# Patient Record
Sex: MALE | Race: WHITE | Employment: OTHER | URBAN - METROPOLITAN AREA
[De-identification: names, ages, dates, MRNs, and addresses within clinical notes are randomized per-mention and may not be internally consistent; named-entity substitution may affect disease eponyms.]

---

## 2019-05-15 ENCOUNTER — OFFICE VISIT (OUTPATIENT)
Dept: INTERNAL MEDICINE CLINIC | Age: 44
End: 2019-05-15
Payer: COMMERCIAL

## 2019-05-15 VITALS
HEART RATE: 81 BPM | WEIGHT: 224 LBS | DIASTOLIC BLOOD PRESSURE: 76 MMHG | OXYGEN SATURATION: 98 % | SYSTOLIC BLOOD PRESSURE: 118 MMHG | HEIGHT: 75 IN | BODY MASS INDEX: 27.85 KG/M2

## 2019-05-15 DIAGNOSIS — R53.82 CHRONIC FATIGUE: ICD-10-CM

## 2019-05-15 DIAGNOSIS — Z00.00 PHYSICAL EXAM, ANNUAL: Primary | ICD-10-CM

## 2019-05-15 DIAGNOSIS — G93.32 CHRONIC FATIGUE SYNDROME: Primary | ICD-10-CM

## 2019-05-15 DIAGNOSIS — Z00.00 PHYSICAL EXAM, ANNUAL: ICD-10-CM

## 2019-05-15 LAB
A/G RATIO: 1.4 (ref 1.1–2.2)
ALBUMIN SERPL-MCNC: 4.4 G/DL (ref 3.4–5)
ALP BLD-CCNC: 76 U/L (ref 40–129)
ALT SERPL-CCNC: 24 U/L (ref 10–40)
ANION GAP SERPL CALCULATED.3IONS-SCNC: 14 MMOL/L (ref 3–16)
AST SERPL-CCNC: 17 U/L (ref 15–37)
BASOPHILS ABSOLUTE: 0 K/UL (ref 0–0.2)
BASOPHILS RELATIVE PERCENT: 0.6 %
BILIRUB SERPL-MCNC: 0.5 MG/DL (ref 0–1)
BUN BLDV-MCNC: 16 MG/DL (ref 7–20)
CALCIUM SERPL-MCNC: 9.3 MG/DL (ref 8.3–10.6)
CHLORIDE BLD-SCNC: 104 MMOL/L (ref 99–110)
CHOLESTEROL, TOTAL: 242 MG/DL (ref 0–199)
CO2: 24 MMOL/L (ref 21–32)
CREAT SERPL-MCNC: 1.3 MG/DL (ref 0.9–1.3)
EOSINOPHILS ABSOLUTE: 0.1 K/UL (ref 0–0.6)
EOSINOPHILS RELATIVE PERCENT: 2.2 %
FOLATE: >20 NG/ML (ref 4.78–24.2)
GFR AFRICAN AMERICAN: >60
GFR NON-AFRICAN AMERICAN: >60
GLOBULIN: 3.1 G/DL
GLUCOSE BLD-MCNC: 91 MG/DL (ref 70–99)
HCT VFR BLD CALC: 48.3 % (ref 40.5–52.5)
HDLC SERPL-MCNC: 34 MG/DL (ref 40–60)
HEMOGLOBIN: 16.7 G/DL (ref 13.5–17.5)
LDL CHOLESTEROL CALCULATED: 167 MG/DL
LYMPHOCYTES ABSOLUTE: 1.8 K/UL (ref 1–5.1)
LYMPHOCYTES RELATIVE PERCENT: 29.3 %
MCH RBC QN AUTO: 31.9 PG (ref 26–34)
MCHC RBC AUTO-ENTMCNC: 34.6 G/DL (ref 31–36)
MCV RBC AUTO: 92.3 FL (ref 80–100)
MONOCYTES ABSOLUTE: 0.4 K/UL (ref 0–1.3)
MONOCYTES RELATIVE PERCENT: 7.2 %
NEUTROPHILS ABSOLUTE: 3.6 K/UL (ref 1.7–7.7)
NEUTROPHILS RELATIVE PERCENT: 60.7 %
PDW BLD-RTO: 13 % (ref 12.4–15.4)
PLATELET # BLD: 198 K/UL (ref 135–450)
PMV BLD AUTO: 8.5 FL (ref 5–10.5)
POTASSIUM SERPL-SCNC: 4.6 MMOL/L (ref 3.5–5.1)
RBC # BLD: 5.23 M/UL (ref 4.2–5.9)
SODIUM BLD-SCNC: 142 MMOL/L (ref 136–145)
TOTAL PROTEIN: 7.5 G/DL (ref 6.4–8.2)
TRIGL SERPL-MCNC: 206 MG/DL (ref 0–150)
TSH SERPL DL<=0.05 MIU/L-ACNC: 2.04 UIU/ML (ref 0.27–4.2)
VITAMIN B-12: 830 PG/ML (ref 211–911)
VITAMIN D 25-HYDROXY: 22.8 NG/ML
VLDLC SERPL CALC-MCNC: 41 MG/DL
WBC # BLD: 6 K/UL (ref 4–11)

## 2019-05-15 PROCEDURE — 99386 PREV VISIT NEW AGE 40-64: CPT | Performed by: INTERNAL MEDICINE

## 2019-05-15 ASSESSMENT — PATIENT HEALTH QUESTIONNAIRE - PHQ9
SUM OF ALL RESPONSES TO PHQ QUESTIONS 1-9: 0
2. FEELING DOWN, DEPRESSED OR HOPELESS: 0
1. LITTLE INTEREST OR PLEASURE IN DOING THINGS: 0
SUM OF ALL RESPONSES TO PHQ QUESTIONS 1-9: 0
SUM OF ALL RESPONSES TO PHQ9 QUESTIONS 1 & 2: 0

## 2019-05-16 LAB
ESTIMATED AVERAGE GLUCOSE: 111.2 MG/DL
HBA1C MFR BLD: 5.5 %

## 2019-05-29 ENCOUNTER — OFFICE VISIT (OUTPATIENT)
Dept: INTERNAL MEDICINE CLINIC | Age: 44
End: 2019-05-29
Payer: COMMERCIAL

## 2019-05-29 VITALS
BODY MASS INDEX: 28.47 KG/M2 | HEART RATE: 76 BPM | SYSTOLIC BLOOD PRESSURE: 110 MMHG | DIASTOLIC BLOOD PRESSURE: 80 MMHG | HEIGHT: 75 IN | WEIGHT: 229 LBS | OXYGEN SATURATION: 99 %

## 2019-05-29 DIAGNOSIS — R53.82 CHRONIC FATIGUE: ICD-10-CM

## 2019-05-29 DIAGNOSIS — Z12.5 SCREENING FOR PROSTATE CANCER: ICD-10-CM

## 2019-05-29 DIAGNOSIS — Z00.00 PHYSICAL EXAM, ANNUAL: Primary | ICD-10-CM

## 2019-05-29 DIAGNOSIS — Z00.00 PHYSICAL EXAM, ANNUAL: ICD-10-CM

## 2019-05-29 LAB — PROSTATE SPECIFIC ANTIGEN: 0.7 NG/ML (ref 0–4)

## 2019-05-29 PROCEDURE — 99396 PREV VISIT EST AGE 40-64: CPT | Performed by: INTERNAL MEDICINE

## 2019-05-29 NOTE — PROGRESS NOTES
Chief complaints:  Jayna Hercules is a 44 y.o. male who presents to the office for a general physical examination. History of present illness:  Here for a physical exam ,  Had fasting blood work last week,   Reviewed blood work,   Also c/o fatigue, and general weakness,   Worried about testosterone levels and psa. No other specific acute problems or symptoms     Past Medical History:   Diagnosis Date    Substance abuse ([address]      Current Outpatient Medications   Medication Sig Dispense Refill    Loratadine-Pseudoephedrine (CLARITIN-D 24 HOUR PO) Take by mouth       No current facility-administered medications for this visit. Allergies : Patient has no known allergies. No past surgical history on file. Family History   Problem Relation Age of Onset    Other Brother     Cancer Paternal Grandmother     Heart Disease Paternal Grandfather     High Blood Pressure Paternal Grandfather     High Cholesterol Paternal Grandfather     Heart Attack Paternal Grandfather      Social History     Tobacco Use    Smoking status: Never Smoker    Smokeless tobacco: Never Used   Substance Use Topics    Alcohol use: Yes     Alcohol/week: 1.2 oz     Types: 2 Glasses of wine per week       Review of systems :  Skin: no abnormal pigmentation, rash, scaling, itching, masses, hair or nail changes  Eyes: negative  Ears/Nose/Throat: negative  Respiratory: negative  Cardiovascular: negative  Gastrointestinal: negative  Genitourinary: negative  Musculoskeletal: negative  Neurologic: negative  Psychiatric: negative  Hematologic/Lymphatic/Immunologic: negative  Endocrine: negative       Objective :     /80 (Site: Right Upper Arm, Position: Sitting, Cuff Size: Medium Adult)   Pulse 76   Ht 6' 2.75\" (1.899 m)   Wt 229 lb (103.9 kg)   SpO2 99%   BMI 28.81 kg/m²   General appearance - healthy, alert, no distress  Skin - Skin color, texture, turgor normal. No rashes or lesions. Head - Normocephalic.  No masses, lesions,

## 2019-05-31 LAB
SEX HORMONE BINDING GLOBULIN: 43 NMOL/L (ref 11–80)
TESTOSTERONE FREE-NONMALE: 91.3 PG/ML (ref 47–244)
TESTOSTERONE TOTAL: 512 NG/DL (ref 220–1000)

## 2019-06-03 ENCOUNTER — TELEPHONE (OUTPATIENT)
Dept: INTERNAL MEDICINE CLINIC | Age: 44
End: 2019-06-03

## 2019-10-02 ENCOUNTER — OFFICE VISIT (OUTPATIENT)
Dept: PRIMARY CARE CLINIC | Age: 44
End: 2019-10-02
Payer: COMMERCIAL

## 2019-10-02 VITALS
HEIGHT: 75 IN | TEMPERATURE: 98 F | BODY MASS INDEX: 27.95 KG/M2 | WEIGHT: 224.8 LBS | OXYGEN SATURATION: 96 % | SYSTOLIC BLOOD PRESSURE: 128 MMHG | RESPIRATION RATE: 16 BRPM | DIASTOLIC BLOOD PRESSURE: 88 MMHG | HEART RATE: 98 BPM

## 2019-10-02 DIAGNOSIS — J32.0 CHRONIC MAXILLARY SINUSITIS: Primary | ICD-10-CM

## 2019-10-02 PROCEDURE — 99213 OFFICE O/P EST LOW 20 MIN: CPT | Performed by: INTERNAL MEDICINE

## 2019-10-02 RX ORDER — METHYLPREDNISOLONE 4 MG/1
TABLET ORAL
Qty: 1 KIT | Refills: 0 | Status: SHIPPED | OUTPATIENT
Start: 2019-10-02 | End: 2019-10-14

## 2019-10-02 RX ORDER — GUAIFENESIN AND PSEUDOEPHEDRINE HCL 1200; 120 MG/1; MG/1
TABLET, EXTENDED RELEASE ORAL
Qty: 20 TABLET | Refills: 0 | Status: ON HOLD | OUTPATIENT
Start: 2019-10-02 | End: 2020-03-18 | Stop reason: HOSPADM

## 2019-10-02 RX ORDER — AMOXICILLIN AND CLAVULANATE POTASSIUM 875; 125 MG/1; MG/1
1 TABLET, FILM COATED ORAL 2 TIMES DAILY
Qty: 20 TABLET | Refills: 0 | Status: SHIPPED | OUTPATIENT
Start: 2019-10-02 | End: 2019-10-12

## 2019-10-14 ENCOUNTER — OFFICE VISIT (OUTPATIENT)
Dept: ENT CLINIC | Age: 44
End: 2019-10-14
Payer: COMMERCIAL

## 2019-10-14 VITALS
SYSTOLIC BLOOD PRESSURE: 127 MMHG | WEIGHT: 227.8 LBS | TEMPERATURE: 97.6 F | HEART RATE: 104 BPM | BODY MASS INDEX: 29.24 KG/M2 | HEIGHT: 74 IN | DIASTOLIC BLOOD PRESSURE: 79 MMHG

## 2019-10-14 DIAGNOSIS — J34.89 NASAL OBSTRUCTION: Primary | ICD-10-CM

## 2019-10-14 DIAGNOSIS — J34.3 HYPERTROPHY OF NASAL TURBINATES: ICD-10-CM

## 2019-10-14 DIAGNOSIS — J34.2 DEVIATED NASAL SEPTUM: ICD-10-CM

## 2019-10-14 DIAGNOSIS — J34.89 INTRANASAL SYNECHIAE: ICD-10-CM

## 2019-10-14 PROCEDURE — 99243 OFF/OP CNSLTJ NEW/EST LOW 30: CPT | Performed by: OTOLARYNGOLOGY

## 2019-10-22 ENCOUNTER — TELEPHONE (OUTPATIENT)
Dept: ENT CLINIC | Age: 44
End: 2019-10-22

## 2020-03-02 ENCOUNTER — TELEPHONE (OUTPATIENT)
Dept: ENT CLINIC | Age: 45
End: 2020-03-02

## 2020-03-02 NOTE — TELEPHONE ENCOUNTER
Pt wants to discuss whether or not surgery is his only option with his sinus condition. Pt has some bleeding and chronic sinusitis and is asking if he could come in and have a nasal scope first before heading to surgery. Advised pt that someone would call to answer his questions as to what next step should be.

## 2020-03-03 ENCOUNTER — OFFICE VISIT (OUTPATIENT)
Dept: ENT CLINIC | Age: 45
End: 2020-03-03
Payer: COMMERCIAL

## 2020-03-03 VITALS
BODY MASS INDEX: 30.29 KG/M2 | HEART RATE: 91 BPM | DIASTOLIC BLOOD PRESSURE: 75 MMHG | SYSTOLIC BLOOD PRESSURE: 110 MMHG | HEIGHT: 74 IN | TEMPERATURE: 98.2 F | WEIGHT: 236 LBS

## 2020-03-03 PROCEDURE — 31231 NASAL ENDOSCOPY DX: CPT | Performed by: OTOLARYNGOLOGY

## 2020-03-03 PROCEDURE — 99212 OFFICE O/P EST SF 10 MIN: CPT | Performed by: OTOLARYNGOLOGY

## 2020-03-09 ENCOUNTER — OFFICE VISIT (OUTPATIENT)
Dept: PRIMARY CARE CLINIC | Age: 45
End: 2020-03-09
Payer: COMMERCIAL

## 2020-03-09 VITALS
HEIGHT: 73 IN | HEART RATE: 92 BPM | WEIGHT: 234 LBS | DIASTOLIC BLOOD PRESSURE: 80 MMHG | OXYGEN SATURATION: 98 % | SYSTOLIC BLOOD PRESSURE: 120 MMHG | BODY MASS INDEX: 31.01 KG/M2

## 2020-03-09 PROBLEM — J34.2 DNS (DEVIATED NASAL SEPTUM): Chronic | Status: ACTIVE | Noted: 2020-03-09

## 2020-03-09 PROCEDURE — 99243 OFF/OP CNSLTJ NEW/EST LOW 30: CPT | Performed by: INTERNAL MEDICINE

## 2020-03-09 ASSESSMENT — PATIENT HEALTH QUESTIONNAIRE - PHQ9
SUM OF ALL RESPONSES TO PHQ9 QUESTIONS 1 & 2: 0
SUM OF ALL RESPONSES TO PHQ QUESTIONS 1-9: 0
2. FEELING DOWN, DEPRESSED OR HOPELESS: 0
1. LITTLE INTEREST OR PLEASURE IN DOING THINGS: 0
SUM OF ALL RESPONSES TO PHQ QUESTIONS 1-9: 0

## 2020-03-09 NOTE — PROGRESS NOTES
negative  Musculoskeletal: negative  Neurologic: negative  Psychiatric: negative  Hematologic/Lymphatic/Immunologic: negative  Endocrine: negative       Objective:      /80 (Site: Left Upper Arm, Position: Sitting, Cuff Size: Medium Adult)   Pulse 92   Ht 6' 1.2\" (1.859 m)   Wt 234 lb (106.1 kg)   SpO2 98%   BMI 30.70 kg/m²   General appearance - healthy, alert, no distress  Skin - Skin color, texture, turgor normal. No rashes or lesions. Head - Normocephalic. No masses, lesions, tenderness or abnormalities  Eyes - conjunctivae/corneas clear. PERRL, EOM's intact. Ears - External ears normal. Canals clear. TM's normal.  Nose/Sinuses - Nares normal. Septum midline. Mucosa normal. No drainage or sinus tenderness. Oropharynx - Lips, mucosa, and tongue normal. Teeth and gums normal. Oropharynx pink and patent  Neck - Neck supple. No adenopathy. Thyroid symmetric, normal size,  Back - Back symmetric, no curvature. ROM normal. No CVA tenderness. Lungs - Percussion normal. Good diaphragmatic excursion. Lungs clear  Heart - Regular rate and rhythm, with no rub, murmur or gallop noted. Abdomen - Abdomen soft, non-tender. BS normal. No masses, organomegaly  Extremities - Extremities normal. No deformities, edema, or skin discolora  Musculoskeletal - Spine ROM normal. Muscular strength intact. Peripheral pulses - radial=2+,, femoral=2+, popliteal=2+, dorsalis pedis=2+,  Neuro - Gait normal. Reflexes normal and symmetric. Sensation grossly normal.  No focal weakness    EKG: not needed. BLOOD WORK: not needed. Assessment:      Pre op eval - SEPTAL RECONSTRUCTION LYSIS INTRANASL SYNECHIAE  DNS (deviated nasal septum)  Here for a pre op eval for  DNS repair,  He is medically stable. Plan:        He is medically cleared for surgery and anesthesia. Per operating surgeon. See also orders filed with this encounter, if any.   Instructions to patient: Do not take any NSAIDS 1 week prior to surgery.   Indication for leah-operative beta blocker therapy: Barbara Christiansen MD   3/9/2020 3:17 PM

## 2020-03-13 NOTE — PROGRESS NOTES
[facility] PRE-SURGICAL TESTING INSTRUCTIONS                              PRIOR TO PROCEDURE DATE:  1. Please follow any guidelines/instructions prior to your procedure as advised by your surgeon. 2. Arrange for someone to drive you home and be with you for the first 24 hours after discharge for your safety after your procedure for which you received sedation. Ensure it is someone we can share information with regarding your discharge. 3. You must contact your surgeon for instructions IF:   You are taking any blood thinners, aspirin, anti-inflammatory or vitamin E.   There is a change in your physical condition such as a cold, fever, rash, cuts, sores or any other infection, especially near your surgical site. 4. Do not drink alcohol the day before or day of your procedure. 5. A Pre-op History and Physical for surgery MUST be completed by your Physician or Urgent Care within 30 days of your procedure date. Please bring a copy with you on the day of your procedure and along with any other testing performed. THE DAY OF YOUR PROCEDURE:  1. Follow instructions for ARRIVAL TIME as DIRECTED BY YOUR SURGEON. I    2. Enter the MAIN entrance from [employer] and follow the signs to the free [employer] or [employer] parking (offered free of charge 6am-5pm). 3. Enter the Main Entrance of the hospital (do not enter from the lower level of the parking garage). Upon entrance, check in with the  at the main desk on your left. If no one is available at the desk, proceed into the [facility] Waiting Room and go through the door directly into the [facility]. There is a Check-in desk ACROSS from Room 5 (marked with a sign hanging from the ceiling). The phone number for the surgery center is [phone]. 4. Please call [phone] option #2 option #2 if you have not been preregistered yet. On the day of your procedure bring your insurance card and photo ID.  You will be potential side effects. 2. For comfort and safety, arrange to have someone at home with you for the first 24 hours after discharge. 3. You and your family will be given written instructions about your diet, activity, dressing care, medications, and return visits. 4. Once at home, should issues with nausea, pain, or bleeding occur, or should you notice any signs of infection, you should call your surgeon. 5. Always clean your hands before and after caring for your wound. Do not let your family touch your surgery site without cleaning their hands. 6. Narcotic pain medications can cause significant constipation. You may want to add a stool softener to your postoperative medication schedule or speak to your surgeon on how best to manage this SIDE EFFECT. SPECIAL INSTRUCTIONS    Thank you for allowing us to care for you. We strive to exceed your expectations in the delivery of care and service provided to you and your family. If you need to contact us for any reason, please call us at [phone]    Instructions reviewed with patient during preadmission testing phone interview. Nelly Mccormick. 3/13/2020 .3:24 PM      ADDITIONAL EDUCATIONAL INFORMATION REVIEWED PER PHONE WITH YOU AND/OR YOUR FAMILY:    Yes Antibacterial Soap

## 2020-03-16 ENCOUNTER — ANESTHESIA EVENT (OUTPATIENT)
Dept: OPERATING ROOM | Age: 45
End: 2020-03-16
Payer: COMMERCIAL

## 2020-03-16 ENCOUNTER — TELEPHONE (OUTPATIENT)
Dept: ENT CLINIC | Age: 45
End: 2020-03-16

## 2020-03-17 ENCOUNTER — ANESTHESIA (OUTPATIENT)
Dept: OPERATING ROOM | Age: 45
End: 2020-03-17
Payer: COMMERCIAL

## 2020-03-17 ENCOUNTER — HOSPITAL ENCOUNTER (OUTPATIENT)
Age: 45
Setting detail: OBSERVATION
Discharge: HOME | End: 2020-03-18
Attending: OTOLARYNGOLOGY | Admitting: OTOLARYNGOLOGY
Payer: COMMERCIAL

## 2020-03-17 VITALS
SYSTOLIC BLOOD PRESSURE: 114 MMHG | OXYGEN SATURATION: 88 % | RESPIRATION RATE: 18 BRPM | TEMPERATURE: 96.4 F | DIASTOLIC BLOOD PRESSURE: 75 MMHG

## 2020-03-17 PROBLEM — J34.89 NASAL OBSTRUCTION: Status: ACTIVE | Noted: 2020-03-17

## 2020-03-17 PROCEDURE — 2580000003 HC RX 258: Performed by: OTOLARYNGOLOGY

## 2020-03-17 PROCEDURE — 6360000002 HC RX W HCPCS: Performed by: ANESTHESIOLOGY

## 2020-03-17 PROCEDURE — 2580000003 HC RX 258: Performed by: ANESTHESIOLOGY

## 2020-03-17 PROCEDURE — 30520 REPAIR OF NASAL SEPTUM: CPT | Performed by: OTOLARYNGOLOGY

## 2020-03-17 PROCEDURE — 2709999900 HC NON-CHARGEABLE SUPPLY: Performed by: OTOLARYNGOLOGY

## 2020-03-17 PROCEDURE — 3700000000 HC ANESTHESIA ATTENDED CARE: Performed by: OTOLARYNGOLOGY

## 2020-03-17 PROCEDURE — 7100000000 HC PACU RECOVERY - FIRST 15 MIN: Performed by: OTOLARYNGOLOGY

## 2020-03-17 PROCEDURE — G0378 HOSPITAL OBSERVATION PER HR: HCPCS

## 2020-03-17 PROCEDURE — 2500000003 HC RX 250 WO HCPCS: Performed by: NURSE ANESTHETIST, CERTIFIED REGISTERED

## 2020-03-17 PROCEDURE — 3600000014 HC SURGERY LEVEL 4 ADDTL 15MIN: Performed by: OTOLARYNGOLOGY

## 2020-03-17 PROCEDURE — 3600000004 HC SURGERY LEVEL 4 BASE: Performed by: OTOLARYNGOLOGY

## 2020-03-17 PROCEDURE — 6360000002 HC RX W HCPCS: Performed by: NURSE ANESTHETIST, CERTIFIED REGISTERED

## 2020-03-17 PROCEDURE — 2500000003 HC RX 250 WO HCPCS: Performed by: OTOLARYNGOLOGY

## 2020-03-17 PROCEDURE — 3700000001 HC ADD 15 MINUTES (ANESTHESIA): Performed by: OTOLARYNGOLOGY

## 2020-03-17 PROCEDURE — 7100000001 HC PACU RECOVERY - ADDTL 15 MIN: Performed by: OTOLARYNGOLOGY

## 2020-03-17 PROCEDURE — 30560 LYSIS INTRANASAL SYNECHIA: CPT | Performed by: OTOLARYNGOLOGY

## 2020-03-17 PROCEDURE — 6370000000 HC RX 637 (ALT 250 FOR IP): Performed by: OTOLARYNGOLOGY

## 2020-03-17 PROCEDURE — 30130 EXCISE INFERIOR TURBINATE: CPT | Performed by: OTOLARYNGOLOGY

## 2020-03-17 RX ORDER — ONDANSETRON 2 MG/ML
4 INJECTION INTRAMUSCULAR; INTRAVENOUS
Status: DISCONTINUED | OUTPATIENT
Start: 2020-03-17 | End: 2020-03-17 | Stop reason: HOSPADM

## 2020-03-17 RX ORDER — LABETALOL 20 MG/4 ML (5 MG/ML) INTRAVENOUS SYRINGE
5 EVERY 10 MIN PRN
Status: DISCONTINUED | OUTPATIENT
Start: 2020-03-17 | End: 2020-03-17 | Stop reason: HOSPADM

## 2020-03-17 RX ORDER — PROMETHAZINE HYDROCHLORIDE 25 MG/1
12.5 TABLET ORAL EVERY 6 HOURS PRN
Status: DISCONTINUED | OUTPATIENT
Start: 2020-03-17 | End: 2020-03-18 | Stop reason: HOSPADM

## 2020-03-17 RX ORDER — HYDRALAZINE HYDROCHLORIDE 20 MG/ML
5 INJECTION INTRAMUSCULAR; INTRAVENOUS EVERY 10 MIN PRN
Status: DISCONTINUED | OUTPATIENT
Start: 2020-03-17 | End: 2020-03-17 | Stop reason: HOSPADM

## 2020-03-17 RX ORDER — FENTANYL CITRATE 50 UG/ML
50 INJECTION, SOLUTION INTRAMUSCULAR; INTRAVENOUS EVERY 5 MIN PRN
Status: DISCONTINUED | OUTPATIENT
Start: 2020-03-17 | End: 2020-03-17 | Stop reason: HOSPADM

## 2020-03-17 RX ORDER — M-VIT,TX,IRON,MINS/CALC/FOLIC 27MG-0.4MG
1 TABLET ORAL DAILY
COMMUNITY
End: 2020-11-23

## 2020-03-17 RX ORDER — OXYCODONE HYDROCHLORIDE 5 MG/1
10 TABLET ORAL EVERY 4 HOURS PRN
Status: DISCONTINUED | OUTPATIENT
Start: 2020-03-17 | End: 2020-03-18 | Stop reason: HOSPADM

## 2020-03-17 RX ORDER — SODIUM CHLORIDE, SODIUM LACTATE, POTASSIUM CHLORIDE, CALCIUM CHLORIDE 600; 310; 30; 20 MG/100ML; MG/100ML; MG/100ML; MG/100ML
INJECTION, SOLUTION INTRAVENOUS CONTINUOUS
Status: DISCONTINUED | OUTPATIENT
Start: 2020-03-17 | End: 2020-03-17

## 2020-03-17 RX ORDER — OXYCODONE HYDROCHLORIDE AND ACETAMINOPHEN 5; 325 MG/1; MG/1
2 TABLET ORAL PRN
Status: DISCONTINUED | OUTPATIENT
Start: 2020-03-17 | End: 2020-03-17 | Stop reason: HOSPADM

## 2020-03-17 RX ORDER — SODIUM CHLORIDE, SODIUM LACTATE, POTASSIUM CHLORIDE, CALCIUM CHLORIDE 600; 310; 30; 20 MG/100ML; MG/100ML; MG/100ML; MG/100ML
INJECTION, SOLUTION INTRAVENOUS CONTINUOUS
Status: DISCONTINUED | OUTPATIENT
Start: 2020-03-17 | End: 2020-03-17 | Stop reason: SDUPTHER

## 2020-03-17 RX ORDER — ROCURONIUM BROMIDE 10 MG/ML
INJECTION, SOLUTION INTRAVENOUS PRN
Status: DISCONTINUED | OUTPATIENT
Start: 2020-03-17 | End: 2020-03-17 | Stop reason: SDUPTHER

## 2020-03-17 RX ORDER — MORPHINE SULFATE 2 MG/ML
2 INJECTION, SOLUTION INTRAMUSCULAR; INTRAVENOUS
Status: DISCONTINUED | OUTPATIENT
Start: 2020-03-17 | End: 2020-03-18 | Stop reason: HOSPADM

## 2020-03-17 RX ORDER — HYDROMORPHONE HCL 110MG/55ML
PATIENT CONTROLLED ANALGESIA SYRINGE INTRAVENOUS PRN
Status: DISCONTINUED | OUTPATIENT
Start: 2020-03-17 | End: 2020-03-17 | Stop reason: SDUPTHER

## 2020-03-17 RX ORDER — SODIUM CHLORIDE 0.9 % (FLUSH) 0.9 %
10 SYRINGE (ML) INJECTION PRN
Status: DISCONTINUED | OUTPATIENT
Start: 2020-03-17 | End: 2020-03-18 | Stop reason: HOSPADM

## 2020-03-17 RX ORDER — DEXAMETHASONE SODIUM PHOSPHATE 4 MG/ML
INJECTION, SOLUTION INTRA-ARTICULAR; INTRALESIONAL; INTRAMUSCULAR; INTRAVENOUS; SOFT TISSUE PRN
Status: DISCONTINUED | OUTPATIENT
Start: 2020-03-17 | End: 2020-03-17 | Stop reason: SDUPTHER

## 2020-03-17 RX ORDER — MEPERIDINE HYDROCHLORIDE 25 MG/ML
12.5 INJECTION INTRAMUSCULAR; INTRAVENOUS; SUBCUTANEOUS EVERY 5 MIN PRN
Status: DISCONTINUED | OUTPATIENT
Start: 2020-03-17 | End: 2020-03-17 | Stop reason: HOSPADM

## 2020-03-17 RX ORDER — MORPHINE SULFATE 4 MG/ML
4 INJECTION, SOLUTION INTRAMUSCULAR; INTRAVENOUS
Status: DISCONTINUED | OUTPATIENT
Start: 2020-03-17 | End: 2020-03-18 | Stop reason: HOSPADM

## 2020-03-17 RX ORDER — OXYCODONE HYDROCHLORIDE 5 MG/1
5 TABLET ORAL EVERY 4 HOURS PRN
Status: DISCONTINUED | OUTPATIENT
Start: 2020-03-17 | End: 2020-03-18 | Stop reason: HOSPADM

## 2020-03-17 RX ORDER — LIDOCAINE HYDROCHLORIDE 20 MG/ML
INJECTION, SOLUTION INTRAVENOUS PRN
Status: DISCONTINUED | OUTPATIENT
Start: 2020-03-17 | End: 2020-03-17 | Stop reason: SDUPTHER

## 2020-03-17 RX ORDER — OXYCODONE HYDROCHLORIDE AND ACETAMINOPHEN 5; 325 MG/1; MG/1
1 TABLET ORAL PRN
Status: DISCONTINUED | OUTPATIENT
Start: 2020-03-17 | End: 2020-03-17 | Stop reason: HOSPADM

## 2020-03-17 RX ORDER — MAGNESIUM HYDROXIDE 1200 MG/15ML
LIQUID ORAL CONTINUOUS PRN
Status: COMPLETED | OUTPATIENT
Start: 2020-03-17 | End: 2020-03-17

## 2020-03-17 RX ORDER — SODIUM CHLORIDE 0.9 % (FLUSH) 0.9 %
10 SYRINGE (ML) INJECTION EVERY 12 HOURS SCHEDULED
Status: DISCONTINUED | OUTPATIENT
Start: 2020-03-17 | End: 2020-03-17 | Stop reason: HOSPADM

## 2020-03-17 RX ORDER — SODIUM CHLORIDE 0.9 % (FLUSH) 0.9 %
10 SYRINGE (ML) INJECTION EVERY 12 HOURS SCHEDULED
Status: DISCONTINUED | OUTPATIENT
Start: 2020-03-17 | End: 2020-03-18 | Stop reason: HOSPADM

## 2020-03-17 RX ORDER — OXYMETAZOLINE HYDROCHLORIDE 0.05 G/100ML
SPRAY NASAL PRN
Status: DISCONTINUED | OUTPATIENT
Start: 2020-03-17 | End: 2020-03-17 | Stop reason: ALTCHOICE

## 2020-03-17 RX ORDER — PROCHLORPERAZINE EDISYLATE 5 MG/ML
5 INJECTION INTRAMUSCULAR; INTRAVENOUS
Status: DISCONTINUED | OUTPATIENT
Start: 2020-03-17 | End: 2020-03-17 | Stop reason: HOSPADM

## 2020-03-17 RX ORDER — ONDANSETRON 2 MG/ML
INJECTION INTRAMUSCULAR; INTRAVENOUS PRN
Status: DISCONTINUED | OUTPATIENT
Start: 2020-03-17 | End: 2020-03-17 | Stop reason: SDUPTHER

## 2020-03-17 RX ORDER — SODIUM CHLORIDE, SODIUM LACTATE, POTASSIUM CHLORIDE, CALCIUM CHLORIDE 600; 310; 30; 20 MG/100ML; MG/100ML; MG/100ML; MG/100ML
INJECTION, SOLUTION INTRAVENOUS CONTINUOUS
Status: DISCONTINUED | OUTPATIENT
Start: 2020-03-17 | End: 2020-03-18 | Stop reason: HOSPADM

## 2020-03-17 RX ORDER — FENTANYL CITRATE 50 UG/ML
25 INJECTION, SOLUTION INTRAMUSCULAR; INTRAVENOUS EVERY 5 MIN PRN
Status: DISCONTINUED | OUTPATIENT
Start: 2020-03-17 | End: 2020-03-17 | Stop reason: HOSPADM

## 2020-03-17 RX ORDER — DIPHENHYDRAMINE HYDROCHLORIDE 50 MG/ML
12.5 INJECTION INTRAMUSCULAR; INTRAVENOUS
Status: DISCONTINUED | OUTPATIENT
Start: 2020-03-17 | End: 2020-03-17 | Stop reason: HOSPADM

## 2020-03-17 RX ORDER — ESMOLOL HYDROCHLORIDE 10 MG/ML
INJECTION INTRAVENOUS PRN
Status: DISCONTINUED | OUTPATIENT
Start: 2020-03-17 | End: 2020-03-17 | Stop reason: SDUPTHER

## 2020-03-17 RX ORDER — ONDANSETRON 2 MG/ML
4 INJECTION INTRAMUSCULAR; INTRAVENOUS EVERY 6 HOURS PRN
Status: DISCONTINUED | OUTPATIENT
Start: 2020-03-17 | End: 2020-03-18 | Stop reason: HOSPADM

## 2020-03-17 RX ORDER — LIDOCAINE HYDROCHLORIDE AND EPINEPHRINE 10; 10 MG/ML; UG/ML
INJECTION, SOLUTION INFILTRATION; PERINEURAL PRN
Status: DISCONTINUED | OUTPATIENT
Start: 2020-03-17 | End: 2020-03-17 | Stop reason: ALTCHOICE

## 2020-03-17 RX ORDER — MIDAZOLAM HYDROCHLORIDE 1 MG/ML
INJECTION INTRAMUSCULAR; INTRAVENOUS PRN
Status: DISCONTINUED | OUTPATIENT
Start: 2020-03-17 | End: 2020-03-17 | Stop reason: SDUPTHER

## 2020-03-17 RX ORDER — PROPOFOL 10 MG/ML
INJECTION, EMULSION INTRAVENOUS PRN
Status: DISCONTINUED | OUTPATIENT
Start: 2020-03-17 | End: 2020-03-17 | Stop reason: SDUPTHER

## 2020-03-17 RX ORDER — SODIUM CHLORIDE 0.9 % (FLUSH) 0.9 %
10 SYRINGE (ML) INJECTION PRN
Status: DISCONTINUED | OUTPATIENT
Start: 2020-03-17 | End: 2020-03-17 | Stop reason: HOSPADM

## 2020-03-17 RX ADMIN — PHENYLEPHRINE HYDROCHLORIDE 80 MCG: 10 INJECTION, SOLUTION INTRAMUSCULAR; INTRAVENOUS; SUBCUTANEOUS at 07:47

## 2020-03-17 RX ADMIN — Medication 10 ML: at 10:47

## 2020-03-17 RX ADMIN — HYDROMORPHONE HYDROCHLORIDE 0.5 MG: 2 INJECTION, SOLUTION INTRAMUSCULAR; INTRAVENOUS; SUBCUTANEOUS at 07:41

## 2020-03-17 RX ADMIN — ESMOLOL HYDROCHLORIDE 50 MG: 10 INJECTION, SOLUTION INTRAVENOUS at 07:28

## 2020-03-17 RX ADMIN — SODIUM CHLORIDE, POTASSIUM CHLORIDE, SODIUM LACTATE AND CALCIUM CHLORIDE: 600; 310; 30; 20 INJECTION, SOLUTION INTRAVENOUS at 17:12

## 2020-03-17 RX ADMIN — ONDANSETRON 4 MG: 2 INJECTION INTRAMUSCULAR; INTRAVENOUS at 07:37

## 2020-03-17 RX ADMIN — OXYCODONE 10 MG: 5 TABLET ORAL at 15:03

## 2020-03-17 RX ADMIN — SUGAMMADEX 200 MG: 100 INJECTION, SOLUTION INTRAVENOUS at 08:10

## 2020-03-17 RX ADMIN — DEXAMETHASONE SODIUM PHOSPHATE 8 MG: 4 INJECTION, SOLUTION INTRAMUSCULAR; INTRAVENOUS at 07:37

## 2020-03-17 RX ADMIN — SODIUM CHLORIDE, SODIUM LACTATE, POTASSIUM CHLORIDE, AND CALCIUM CHLORIDE: 600; 310; 30; 20 INJECTION, SOLUTION INTRAVENOUS at 07:20

## 2020-03-17 RX ADMIN — FENTANYL CITRATE 50 MCG: 50 INJECTION, SOLUTION INTRAMUSCULAR; INTRAVENOUS at 08:40

## 2020-03-17 RX ADMIN — FENTANYL CITRATE 50 MCG: 50 INJECTION, SOLUTION INTRAMUSCULAR; INTRAVENOUS at 08:30

## 2020-03-17 RX ADMIN — OXYCODONE 10 MG: 5 TABLET ORAL at 19:31

## 2020-03-17 RX ADMIN — PHENYLEPHRINE HYDROCHLORIDE 80 MCG: 10 INJECTION, SOLUTION INTRAMUSCULAR; INTRAVENOUS; SUBCUTANEOUS at 07:45

## 2020-03-17 RX ADMIN — PROPOFOL 300 MG: 10 INJECTION, EMULSION INTRAVENOUS at 07:28

## 2020-03-17 RX ADMIN — HYDROMORPHONE HYDROCHLORIDE 0.5 MG: 1 INJECTION, SOLUTION INTRAMUSCULAR; INTRAVENOUS; SUBCUTANEOUS at 08:49

## 2020-03-17 RX ADMIN — SODIUM CHLORIDE, SODIUM LACTATE, POTASSIUM CHLORIDE, AND CALCIUM CHLORIDE: 600; 310; 30; 20 INJECTION, SOLUTION INTRAVENOUS at 08:02

## 2020-03-17 RX ADMIN — LIDOCAINE HYDROCHLORIDE 100 MG: 20 INJECTION, SOLUTION INTRAVENOUS at 07:28

## 2020-03-17 RX ADMIN — PROPOFOL 50 MG: 10 INJECTION, EMULSION INTRAVENOUS at 08:04

## 2020-03-17 RX ADMIN — ROCURONIUM BROMIDE 100 MG: 10 INJECTION, SOLUTION INTRAVENOUS at 07:28

## 2020-03-17 RX ADMIN — SODIUM CHLORIDE, SODIUM LACTATE, POTASSIUM CHLORIDE, AND CALCIUM CHLORIDE: 600; 310; 30; 20 INJECTION, SOLUTION INTRAVENOUS at 06:51

## 2020-03-17 RX ADMIN — HYDROMORPHONE HYDROCHLORIDE 0.5 MG: 1 INJECTION, SOLUTION INTRAMUSCULAR; INTRAVENOUS; SUBCUTANEOUS at 09:22

## 2020-03-17 RX ADMIN — SODIUM CHLORIDE, POTASSIUM CHLORIDE, SODIUM LACTATE AND CALCIUM CHLORIDE 125 ML/HR: 600; 310; 30; 20 INJECTION, SOLUTION INTRAVENOUS at 10:47

## 2020-03-17 RX ADMIN — HYDROMORPHONE HYDROCHLORIDE 0.5 MG: 2 INJECTION, SOLUTION INTRAMUSCULAR; INTRAVENOUS; SUBCUTANEOUS at 08:04

## 2020-03-17 RX ADMIN — MIDAZOLAM HYDROCHLORIDE 2 MG: 2 INJECTION, SOLUTION INTRAMUSCULAR; INTRAVENOUS at 07:20

## 2020-03-17 RX ADMIN — PROPOFOL 50 MG: 10 INJECTION, EMULSION INTRAVENOUS at 07:59

## 2020-03-17 RX ADMIN — HYDROMORPHONE HYDROCHLORIDE 0.5 MG: 2 INJECTION, SOLUTION INTRAMUSCULAR; INTRAVENOUS; SUBCUTANEOUS at 08:24

## 2020-03-17 RX ADMIN — HYDROMORPHONE HYDROCHLORIDE 0.5 MG: 2 INJECTION, SOLUTION INTRAMUSCULAR; INTRAVENOUS; SUBCUTANEOUS at 07:20

## 2020-03-17 ASSESSMENT — PULMONARY FUNCTION TESTS
PIF_VALUE: 18
PIF_VALUE: 19
PIF_VALUE: 19
PIF_VALUE: 4
PIF_VALUE: 1
PIF_VALUE: 19
PIF_VALUE: 19
PIF_VALUE: 20
PIF_VALUE: 19
PIF_VALUE: 0
PIF_VALUE: 19
PIF_VALUE: 19
PIF_VALUE: 20
PIF_VALUE: 19
PIF_VALUE: 20
PIF_VALUE: 10
PIF_VALUE: 19
PIF_VALUE: 20
PIF_VALUE: 19
PIF_VALUE: 20
PIF_VALUE: 3
PIF_VALUE: 19
PIF_VALUE: 1
PIF_VALUE: 0
PIF_VALUE: 9
PIF_VALUE: 19
PIF_VALUE: 19
PIF_VALUE: 20
PIF_VALUE: 1
PIF_VALUE: 19
PIF_VALUE: 20
PIF_VALUE: 19
PIF_VALUE: 19
PIF_VALUE: 3
PIF_VALUE: 18
PIF_VALUE: 19
PIF_VALUE: 19

## 2020-03-17 ASSESSMENT — PAIN DESCRIPTION - FREQUENCY
FREQUENCY: CONTINUOUS

## 2020-03-17 ASSESSMENT — PAIN SCALES - GENERAL
PAINLEVEL_OUTOF10: 2
PAINLEVEL_OUTOF10: 0
PAINLEVEL_OUTOF10: 7
PAINLEVEL_OUTOF10: 9
PAINLEVEL_OUTOF10: 7
PAINLEVEL_OUTOF10: 9
PAINLEVEL_OUTOF10: 2
PAINLEVEL_OUTOF10: 7
PAINLEVEL_OUTOF10: 9
PAINLEVEL_OUTOF10: 4
PAINLEVEL_OUTOF10: 8

## 2020-03-17 ASSESSMENT — PAIN DESCRIPTION - LOCATION
LOCATION: NOSE
LOCATION: FACE;NOSE
LOCATION: FACE;NOSE

## 2020-03-17 ASSESSMENT — PAIN DESCRIPTION - PROGRESSION
CLINICAL_PROGRESSION: GRADUALLY WORSENING
CLINICAL_PROGRESSION: GRADUALLY IMPROVING

## 2020-03-17 ASSESSMENT — PAIN DESCRIPTION - ONSET: ONSET: ON-GOING

## 2020-03-17 ASSESSMENT — PAIN DESCRIPTION - ORIENTATION
ORIENTATION: MID

## 2020-03-17 ASSESSMENT — PAIN DESCRIPTION - DESCRIPTORS
DESCRIPTORS: PRESSURE
DESCRIPTORS: ACHING
DESCRIPTORS: PRESSURE

## 2020-03-17 ASSESSMENT — PAIN - FUNCTIONAL ASSESSMENT: PAIN_FUNCTIONAL_ASSESSMENT: 0-10

## 2020-03-17 ASSESSMENT — PAIN DESCRIPTION - PAIN TYPE
TYPE: SURGICAL PAIN

## 2020-03-17 NOTE — PROGRESS NOTES
PACU Transfer Note    Vitals:    03/17/20 0945   BP: 128/82   Pulse: 91   Resp: 14   Temp: 97.8 °F (36.6 °C)   SpO2: 97%       In: 1163 [I.V.:1163]  Out: 5     Pain assessment:  present - adequately treated  Pain Level:  4(Patient states that he is very comfortable at this time)    Report given to Receiving unit RN.    3/17/2020 9:49 AM

## 2020-03-17 NOTE — PROGRESS NOTES
External dressing changed to nose due to slight drainage noted to outside of area. Will continue to monitor and change as needed.  Electronically signed by Nicole Bolivar RN on 3/17/2020 at 10:49 AM

## 2020-03-17 NOTE — OP NOTE
[facility]               [address]                                OPERATIVE REPORT    PATIENT NAME: Bismark Rodriguez                       :        1975  MED REC NO:   [id number]                          ROOM:  ACCOUNT NO:   [de-identified]                           ADMIT DATE: 2020  PROVIDER:     Sanjay Jacobs MD    DATE OF PROCEDURE:  2020    PREOPERATIVE DIAGNOSES:  1.  Nasal airway obstruction. 2.  Deviated nasal septum. 3.  Intranasal synechia left. 4.  Hypertrophy of left inferior turbinate. POSTOPERATIVE DIAGNOSES:  1.  Nasal airway obstruction. 2.  Deviated nasal septum. 3.  Intranasal synechia left. 4.  Hypertrophy of left inferior turbinate. OPERATION PERFORMED:  1. Septal reconstruction. 2.  Lysis of intranasal synechia left  3. Partial resection inferior turbinate left. SURGEON:  Sanjay Jacobs MD    ANESTHESIA:  General with endotracheal intubation. OPERATIVE PROCEDURE:  Under satisfactory general anesthesia, the nose  was topically anesthetized with Afrin pledgets. The septal space, the  left inferior turbinate, and the synechia were injected with lidocaine  1% with epinephrine 1:100,000, 4 mL were used. Examination revealed a  septal deviation to the right and a large synechia between the left  septum and the left inferior turbinate. A cautery pencil set at 25 cut  and 25 spray, blend 1 was used to lyse the intranasal synechia. The  synechiae was long and occluded most of the left nasal airway. After  this had been completed, a left anterior hemitransfixion incision was  made with a 15-blade and carried down to the level of the septal  cartilage. The plane between the cartilage and the perichondrium was  developed with a Patty knife. A Nicollet elevator was used to elevate  the left mucoperichondrial flap.   The septal cartilage itself was  transfixed with a 15-blade 5 mm back from its free edge. A right  mucoperichondrial flap was similarly elevated. Deviated portions of  septal cartilage were removed with the scissors and the Cortney  forceps. Care was taken to leave adequate cartilage dorsally and  caudally to support the architecture of the nose. Much of the deviation  was from the vomer spur to the right. The mucoperiosteum was elevated  from the vomer and the vomer spur was taken down with a mallet and a  gouge. This produced a good nasal airway. The hemitransfixion incision  was closed with several sutures of 4-0 chromic catgut. The left  inferior turbinate was hypertrophic. The left inferior turbinate was  outfractured with a turbinate scissors and then partially resected. The  free edge of the partially resected turbinate was cauterized with  suction cautery set at 30 spray. The nose was packed with Merocel  sponges. The nose was taped and a mustache dressing was put in place. The patient tolerated the procedure well and was transferred to the  recovery room in good condition. Estimated blood loss 10 ml.         Kim Acosta MD    D: 03/17/2020 8:20:05       T: 03/17/2020 8:25:20     [email or web address]  Job#: [id number]     Doc#: [id number]    CC:

## 2020-03-17 NOTE — ANESTHESIA PRE PROCEDURE
results found for: LABABO, [address]    Anesthesia Evaluation    Airway: Mallampati: II  TM distance: >3 FB   Neck ROM: full  Mouth opening: < 3 FB Dental:          Pulmonary:       (-) asthma and sleep apnea (was told to get a study but did not)                           Cardiovascular:  Exercise tolerance: good (>4 METS),       (-) hypertension, past MI, dysrhythmias,  angina and  DIAS                Neuro/Psych:      (-) seizures           GI/Hepatic/Renal:   (+) GERD: well controlled,           Endo/Other:        (-) diabetes mellitus               Abdominal:           Vascular:                                        Anesthesia Plan      general     ASA 2     (-npo MN  -denies any cardiac history, no chest pain or palpitations)  Induction: intravenous. MIPS: Postoperative opioids intended. Anesthetic plan and risks discussed with patient. Plan discussed with CRNA.     Attending anesthesiologist reviewed and agrees with Pre Eval content            Kim Dewitt MD   3/17/2020

## 2020-03-17 NOTE — ANESTHESIA POSTPROCEDURE EVALUATION
Department of Anesthesiology  Postprocedure Note    Patient: Aimee Rodriguez  MRN: [id number]  YOB: 1975  Date of evaluation: 3/17/2020  Time:  10:25 AM     Procedure Summary     Date:  03/17/20 Room / Location:  [address] [place] [address] [address]    Anesthesia Start:  0725 Anesthesia Stop:  0675    Procedure:  PARTIAL RECONSTRUCTION OF LEFT INFERIOR TURBINATE, SEPTAL RECONSTRUCTION, LYSIS INTRANASAL SYNECHIA (N/A ) Diagnosis:       Nasal obstruction      Deviated septum      (Nasal obstruction [J34.89], Deviated septum [J34.2])    Surgeon: Rosio Rodriguez MD Responsible Provider:  Carmen Avelar MD    Anesthesia Type:  general ASA Status:  2          Anesthesia Type: general    Zoey Phase I: Zoey Score: 10    Zoey Phase II:      Last vitals: Reviewed and per EMR flowsheets.        Anesthesia Post Evaluation    Patient location during evaluation: PACU  Patient participation: complete - patient participated  Level of consciousness: awake  Pain score: 2  Airway patency: patent  Nausea & Vomiting: no nausea and no vomiting  Complications: no  Cardiovascular status: hemodynamically stable  Respiratory status: acceptable  Hydration status: euvolemic

## 2020-03-17 NOTE — CARE COORDINATION
Case Management Assessment           Initial Evaluation                Date / Time of Evaluation: 3/17/2020 1:14 PM                 Assessment Completed by: Marilynn Villar    Patient Name: Lin Horton     YOB: 1975  Diagnosis: Nasal obstruction [J34.89]  Deviated septum [J34.2]  Nasal obstruction [J34.89]     Date / Time: 3/17/2020  5:27 AM    Patient Admission Status: Observation    If patient is discharged prior to next notation, then this note serves as note for discharge by case management. Current PCP: Kassidy Altman MD  Clinic Patient: No    Chart Reviewed: No  Patient/ Family Interviewed: No    Initial assessment completed at bedside with: pt    Hospitalization in the last 30 days: No    Emergency Contacts:  Extended Emergency Contact Information  Primary Emergency Contact: [address] [facility], [address] [place] [address] Phone: [phone]  Mobile Phone: [phone]  Relation: Brother/Sister   needed? No    Advance Directives:   Code Status: Full Code    Healthcare Power of : No    Financial  Payor: Socorro Grace / Plan: Al Lopez LUCIEN / Product Type: *No Product type* /     Pre-cert required for SNF: Yes    Pharmacy    [id number] [address] [facility] [address]  Phone: [phone] Fax: [phone]    [facility] [address], [id number] [place] [id number]  Phone: [phone] Fax: [phone]      Potential assistance Purchasing Medications: Potential Assistance Purchasing Medications: No  Does Patient want to participate in local refill/ meds to beds program?: Yes    Meds To Beds General Rules:  1. Can ONLY be done Monday- Friday between 8:30am-5pm  2. Prescription(s) must be in pharmacy by 3pm to be filled same day  3. Copy of patient's insurance/ prescription drug card and patient face sheet must be sent along with the individualized plan of care/goals and shares the quality data associated with the providers.  Not Indicated      Care Transition patient: Rosa Shearer RN  [facility]  Case Management Department  Ph: [phone]   Fax: [phone]

## 2020-03-18 VITALS
TEMPERATURE: 97.2 F | RESPIRATION RATE: 18 BRPM | BODY MASS INDEX: 30.16 KG/M2 | DIASTOLIC BLOOD PRESSURE: 92 MMHG | OXYGEN SATURATION: 95 % | WEIGHT: 235 LBS | HEIGHT: 74 IN | HEART RATE: 74 BPM | SYSTOLIC BLOOD PRESSURE: 146 MMHG

## 2020-03-18 PROCEDURE — G0378 HOSPITAL OBSERVATION PER HR: HCPCS

## 2020-03-18 PROCEDURE — 6370000000 HC RX 637 (ALT 250 FOR IP): Performed by: OTOLARYNGOLOGY

## 2020-03-18 RX ORDER — OXYCODONE HYDROCHLORIDE AND ACETAMINOPHEN 5; 325 MG/1; MG/1
1 TABLET ORAL EVERY 4 HOURS PRN
Qty: 25 TABLET | Refills: 0 | Status: SHIPPED | OUTPATIENT
Start: 2020-03-18 | End: 2020-03-23

## 2020-03-18 RX ADMIN — OXYCODONE 10 MG: 5 TABLET ORAL at 02:56

## 2020-03-18 ASSESSMENT — PAIN SCALES - GENERAL
PAINLEVEL_OUTOF10: 0
PAINLEVEL_OUTOF10: 7

## 2020-03-18 ASSESSMENT — PAIN DESCRIPTION - LOCATION: LOCATION: NOSE

## 2020-03-18 ASSESSMENT — PAIN DESCRIPTION - DESCRIPTORS: DESCRIPTORS: ACHING

## 2020-03-18 ASSESSMENT — PAIN DESCRIPTION - ORIENTATION: ORIENTATION: MID

## 2020-03-18 ASSESSMENT — PAIN DESCRIPTION - PROGRESSION: CLINICAL_PROGRESSION: GRADUALLY WORSENING

## 2020-03-18 ASSESSMENT — PAIN DESCRIPTION - PAIN TYPE: TYPE: SURGICAL PAIN

## 2020-03-18 ASSESSMENT — PAIN DESCRIPTION - FREQUENCY: FREQUENCY: CONTINUOUS

## 2020-03-18 ASSESSMENT — PAIN DESCRIPTION - ONSET: ONSET: ON-GOING

## 2020-03-18 NOTE — PROGRESS NOTES
D/C education completed with patient who stated understanding. Pt denied pain. Vitals WNL. IV removed with catheter intact.   Electronically signed by Palmer Estrella RN on 3/18/2020 at 9:23 AM

## 2020-03-18 NOTE — DISCHARGE INSTR - COC
Continuity of Care Form    Patient Name: Samara Meneses   :  1975  MRN:  [id number]    Admit date:  3/17/2020  Discharge date:  ***    Code Status Order: Full Code   Advance Directives:   Advance Care Flowsheet Documentation     Date/Time Healthcare Directive Type of Healthcare Directive Copy in [address] Agent's Name Healthcare Agent's Phone Number    20 1129  No, patient does not have an advance directive for healthcare treatment -- -- -- -- --    20 0614  No, patient does not have an advance directive for healthcare treatment -- -- -- -- --    20 1522  No, patient does not have an advance directive for healthcare treatment -- -- -- -- --          Admitting Physician:  Len Crawford MD  PCP: Vita New MD    Discharging Nurse: [facility] Unit/Room#: 4232/1253-58  Discharging Unit Phone Number: ***    Emergency Contact:   Extended Emergency Contact Information  Primary Emergency Contact: Kimber [address] [place] [address] Phone: [phone]  Mobile Phone: [phone]  Relation: Brother/Sister   needed? No    Past Surgical History:  Past Surgical History:   Procedure Laterality Date    SEPTOPLASTY N/A 3/17/2020    PARTIAL RECONSTRUCTION OF LEFT INFERIOR TURBINATE, SEPTAL RECONSTRUCTION, LYSIS INTRANASAL SYNECHIA performed by Len Crawford MD at [facility]       Immunization History: There is no immunization history for the selected administration types on file for this patient.     Active Problems:  Patient Active Problem List   Diagnosis Code    Chronic fatigue R53.82    Deviated nasal septum J34.2    Nasal obstruction J34.89    Hypertrophy of nasal turbinates J34.3    Adhesions of nasal septum and turbinates J34.89       Isolation/Infection:   Isolation          No Isolation        Patient Infection Status     None to display          Nurse Assessment:  Last Vital Signs: BP (!) 146/92   Pulse 74   Temp 97.2 °F (36.2 °C)

## 2020-03-19 NOTE — DISCHARGE SUMMARY
[facility]               [address]                               DISCHARGE SUMMARY  PATIENT NAME: Dennie Hoguet                       :        1975  MED REC NO:   [id number]                          ROOM:       5323  ACCOUNT NO:   [de-identified]                           ADMIT DATE: 2020  PROVIDER:     Susan Galo MD                  DISCHARGE DATE:  2020      ADMISSION DIAGNOSES:  Nasal obstruction, septal deviation, intranasal  adhesions and turbinate hypertrophy. OPERATION:  Septal reconstruction, partial resection of inferior  turbinate, lysis of intranasal adhesions. Date of surgery was 2020. FINAL SUMMARY:  This patient is a 52-year-old male who had nasal  obstruction due to septal deviation, intranasal adhesions and turbinate  hypertrophy. He underwent surgery on 2020. His postoperative  course was uneventful. His nasal packing was removed on the morning of  2020 and he was discharged to home in improved condition on  Percocet. He will follow up in the office in 48 hours.         Susana Osler, MD    D: 2020 7:56:41       T: 2020 8:03:07     [email or web address]  Job#: [id number]     Doc#: [id number]    CC:

## 2020-03-20 ENCOUNTER — OFFICE VISIT (OUTPATIENT)
Dept: ENT CLINIC | Age: 45
End: 2020-03-20

## 2020-03-20 PROCEDURE — 99024 POSTOP FOLLOW-UP VISIT: CPT | Performed by: OTOLARYNGOLOGY

## 2020-03-20 NOTE — PROGRESS NOTES
3 days following septal reconstruction, turbinate resection, and lysis of intranasal synechia. Excellent airway on both sides. Both nares decongested, debrided. Great airway.     Follow up: prn

## 2020-03-30 ENCOUNTER — OFFICE VISIT (OUTPATIENT)
Dept: ENT CLINIC | Age: 45
End: 2020-03-30

## 2020-03-30 PROCEDURE — 99024 POSTOP FOLLOW-UP VISIT: CPT | Performed by: OTOLARYNGOLOGY

## 2020-03-31 NOTE — PROGRESS NOTES
Nearly 2 weeks following septal reconstruction, lysis of left intranasal synechia, partial resection of the left inferior turbinate. The right side is completely healed with an excellent airway. There is still some eschar on the left side. Left naris decongested and anesthetized topically, eschar cleaned with a bayonet forceps. Continue saline spray. Follow-up 1 week if nasal obstruction persists on the left.

## 2020-08-03 ENCOUNTER — NURSE TRIAGE (OUTPATIENT)
Dept: OTHER | Facility: CLINIC | Age: 45
End: 2020-08-03

## 2020-08-04 ENCOUNTER — VIRTUAL VISIT (OUTPATIENT)
Dept: PRIMARY CARE CLINIC | Age: 45
End: 2020-08-04
Payer: COMMERCIAL

## 2020-08-04 PROBLEM — J06.9 URI WITH COUGH AND CONGESTION: Status: ACTIVE | Noted: 2020-08-04

## 2020-08-04 PROBLEM — K21.00 GASTROESOPHAGEAL REFLUX DISEASE WITH ESOPHAGITIS: Chronic | Status: ACTIVE | Noted: 2020-08-04

## 2020-08-04 PROCEDURE — 99214 OFFICE O/P EST MOD 30 MIN: CPT | Performed by: INTERNAL MEDICINE

## 2020-08-04 RX ORDER — AZITHROMYCIN 250 MG/1
250 TABLET, FILM COATED ORAL SEE ADMIN INSTRUCTIONS
Qty: 6 TABLET | Refills: 0 | Status: SHIPPED | OUTPATIENT
Start: 2020-08-04 | End: 2020-08-09

## 2020-08-04 RX ORDER — OMEPRAZOLE 40 MG/1
40 CAPSULE, DELAYED RELEASE ORAL
Qty: 90 CAPSULE | Refills: 1 | Status: SHIPPED | OUTPATIENT
Start: 2020-08-04 | End: 2020-11-23

## 2020-08-04 RX ORDER — ONDANSETRON 4 MG/1
4 TABLET, FILM COATED ORAL 3 TIMES DAILY PRN
Qty: 30 TABLET | Refills: 0 | Status: SHIPPED | OUTPATIENT
Start: 2020-08-04 | End: 2020-11-23

## 2020-08-04 NOTE — ASSESSMENT & PLAN NOTE
I discussed at length the etiology and pathology of GERD. Advised the patient of diet and lifestyle modification. Advised the patient to avoid foods like caffiene, carbonated drinks, acidic juices, cheese and diary products, spicy food, alchohol and fatty foods. Advised against cigarette smoking and consumption of alchohol. Advised against eating heavy meals. Advised against using water beds. Advised the importance of losing weight and avoidance of wearing tight clothing. Will start ppi, for 4 weeks, to be taken before food. F/u in 1 month, if symptoms do not improve ,we will need to get further GI testing.

## 2020-08-04 NOTE — PROGRESS NOTES
2020    TELEHEALTH EVALUATION -- Audio/Visual (During GYDJQ-09 public health emergency)    HPI:    Shar Tan (:  1975) has requested an audio/video evaluation for the following concern(s):    Established patient here for a visit to manage acute and chronic medical conditions as detailed below. Gastroesophageal reflux disease with esophagitis  I discussed at length the etiology and pathology of GERD. Advised the patient of diet and lifestyle modification. Advised the patient to avoid foods like caffiene, carbonated drinks, acidic juices, cheese and diary products, spicy food, alchohol and fatty foods. Advised against cigarette smoking and consumption of alchohol. Advised against eating heavy meals. Advised against using water beds. Advised the importance of losing weight and avoidance of wearing tight clothing. Will start ppi, for 4 weeks, to be taken before food. F/u in 1 month, if symptoms do not improve ,we will need to get further GI testing. URI with cough and congestion  He c/o cough, congestion, and some nasal bleeding, no sore throat, no fever or chills,  No sob or wheezing,  Non-smoker. No exposure to covid, no recent travel,   Recently had DNS surgery, he feels better w that. He is not sure where the blood is coming from. Start antibiotics and over-the counter decongestants. Consume a lot of fluids, rest and call if no better in 5 days, or if new symptoms develop. Deviated nasal septum  S/p surgery. Review of Systems  ROS: No unusual headaches or allergy symptoms or blurred vision. No prolonged cough. No flushing or facial pain or chest pain,dizziness, dyspnea, palpitations, or chest pain on exertion. No syncope. No nausea or vommitting or diarrhea. No jaundice or abdominal pain, change in bowel habits, black or bloody stools. No dysuria or hematuria or frequency of urination. No myalgias or muscle pain. No numbness, weakness, or tingling.  No falls, or loss of consciousness. No weight loss or back pain. No falls. No paresthesias. No joint swelling or redness. No joint pain. No recent weight loss. No focal weakness or sensory deficits or paresthesias, No confusion or altered sensorium. No hematemesis. No hearing loss. No siezures. All other systems were reviewed, and review was negative. Prior to Visit Medications    Medication Sig Taking? Authorizing Provider   azithromycin (ZITHROMAX) 250 MG tablet Take 1 tablet by mouth See Admin Instructions for 5 days 500mg on day 1 followed by 250mg on days 2 - 5 Yes Caren Canavan, MD   ondansetron (ZOFRAN) 4 MG tablet Take 1 tablet by mouth 3 times daily as needed for Nausea or Vomiting Yes Caren Canavan, MD   omeprazole (PRILOSEC) 40 MG delayed release capsule Take 1 capsule by mouth every morning (before breakfast) Yes Caren Canavan, MD   Multiple Vitamins-Minerals (THERAPEUTIC MULTIVITAMIN-MINERALS) tablet Take 1 tablet by mouth daily Yes Historical Provider, MD       Social History     Tobacco Use    Smoking status: Never Smoker    Smokeless tobacco: Never Used   Substance Use Topics    Alcohol use: Yes     Alcohol/week: 2.0 standard drinks     Types: 2 Glasses of wine per week     Comment: socially    Drug use: Not Currently     Types: Cocaine     Comment: in late 20s         Allergies   Allergen Reactions    Other Itching     Coloring on pill specifically pink and yellow    ,   Past Medical History:   Diagnosis Date    DNS (deviated nasal septum) 3/9/2020    GERD (gastroesophageal reflux disease)     Headache     Nasal congestion     Nosebleed     [facility] ([facility])     Tinnitus    ,   Social History     Tobacco Use    Smoking status: Never Smoker    Smokeless tobacco: Never Used   Substance Use Topics    Alcohol use:  Yes     Alcohol/week: 2.0 standard drinks     Types: 2 Glasses of wine per week     Comment: socially    Drug use: Not Currently     Types: Cocaine     Comment: in late 25s PHYSICAL EXAMINATION:  [ INSTRUCTIONS:  \"[x]\" Indicates a positive item  \"[]\" Indicates a negative item  -- DELETE ALL ITEMS NOT EXAMINED]  Vital Signs: (As obtained by patient/caregiver or practitioner observation)    Blood pressure- 132/82 Heart rate- 72   Respiratory rate- 14   Temperature-  Pulse oximetry-     Constitutional: [x] Appears well-developed and well-nourished [x] No apparent distress      [] Abnormal-   Mental status  [x] Alert and awake  [x] Oriented to person/place/time [x]Able to follow commands      Eyes:  EOM    [x]  Normal  [] Abnormal-  Sclera  [x]  Normal  [] Abnormal -         Discharge [x]  None visible  [] Abnormal -    HENT:   [x] Normocephalic, atraumatic. [] Abnormal   [x] Mouth/Throat: Mucous membranes are moist.     External Ears [x] Normal  [] Abnormal-     Neck: [x] No visualized mass     Pulmonary/Chest: [x] Respiratory effort normal.  [x] No visualized signs of difficulty breathing or respiratory distress        [] Abnormal-      Musculoskeletal:   [x] Normal gait with no signs of ataxia         [x] Normal range of motion of neck        [] Abnormal-       Neurological:        [x] No Facial Asymmetry (Cranial nerve 7 motor function) (limited exam to video visit)          [x] No gaze palsy        [] Abnormal-         Skin:        [x] No significant exanthematous lesions or discoloration noted on facial skin         [] Abnormal-            Psychiatric:       [x] Normal Affect [x] No Hallucinations        [] Abnormal-     Other pertinent observable physical exam findings-     ASSESSMENT/PLAN:  Gastroesophageal reflux disease with esophagitis  I discussed at length the etiology and pathology of GERD. Advised the patient of diet and lifestyle modification. Advised the patient to avoid foods like caffiene, carbonated drinks, acidic juices, cheese and diary products, spicy food, alchohol and fatty foods. Advised against cigarette smoking and consumption of alchohol.  Advised against eating heavy meals. Advised against using water beds. Advised the importance of losing weight and avoidance of wearing tight clothing. Will start ppi, for 4 weeks, to be taken before food. F/u in 1 month, if symptoms do not improve ,we will need to get further GI testing. URI with cough and congestion  He c/o cough, congestion, and some nasal bleeding, no sore throat, no fever or chills,  No sob or wheezing,  Non-smoker. No exposure to covid, no recent travel,   Recently had DNS surgery, he feels better w that. He is not sure where the blood is coming from. Start antibiotics and over-the counter decongestants. Consume a lot of fluids, rest and call if no better in 5 days, or if new symptoms develop. Deviated nasal septum  S/p surgery. Call us if no better in 2 weeks. . May need further evaluation. Anita Solorio is a 38 y.o. male being evaluated by a Virtual Visit (video visit) encounter to address concerns as mentioned above. A caregiver was present when appropriate. Due to this being a TeleHealth encounter (During OSLXO-22 public health emergency), evaluation of the following organ systems was limited: Vitals/Constitutional/EENT/Resp/CV/GI//MS/Neuro/Skin/Heme-Lymph-Imm. Pursuant to the emergency declaration under the [facility], [address] [place] [address] authority and the [employer] General Act, this Virtual Visit was conducted with patient's (and/or legal guardian's) consent, to reduce the patient's risk of exposure to COVID-19 and provide necessary medical care. The patient (and/or legal guardian) has also been advised to contact this office for worsening conditions or problems, and seek emergency medical treatment and/or call 911 if deemed necessary.      Patient identification was verified at the start of the visit: Yes    Total time spent on this encounter: Not billed by time    Services were provided through a video synchronous discussion virtually to substitute for in-person clinic visit. Patient and provider were located at their individual homes. --Penelope Mendoza MD on 8/4/2020 at 2:51 PM    An electronic signature was used to authenticate this note.

## 2020-10-22 ENCOUNTER — NURSE ONLY (OUTPATIENT)
Dept: PRIMARY CARE CLINIC | Age: 45
End: 2020-10-22
Payer: COMMERCIAL

## 2020-10-22 PROCEDURE — 90686 IIV4 VACC NO PRSV 0.5 ML IM: CPT | Performed by: INTERNAL MEDICINE

## 2020-10-22 PROCEDURE — 90471 IMMUNIZATION ADMIN: CPT | Performed by: INTERNAL MEDICINE

## 2020-10-22 NOTE — PROGRESS NOTES
Vaccine Information Sheet, \"Influenza - Inactivated\"  given to [place] Old, or parent/legal guardian of  Manchaca Old and verbalized understanding. Patient responses:    Have you ever had a reaction to a flu vaccine? No  Do you have any current illness? No  Have you ever had [place]illian [place] Syndrome? No  Do you have a serious allergy to any of the follow: Neomycin, Polymyxin, Thimerosal, eggs or egg products? No    Flu vaccine given per order. Please see immunization tab. Risks and benefits explained. Current VIS given.       Immunizations Administered     Name Date Dose Route    Influenza, Quadv, IM, PF (6 mo and older Fluzone, Flulaval, Fluarix, and 3 yrs and older Afluria) 10/22/2020 0.5 mL Intramuscular    Site: Deltoid- Left    Lot: [id number]    NDC: 26842-251-93

## 2020-11-23 ENCOUNTER — OFFICE VISIT (OUTPATIENT)
Dept: PRIMARY CARE CLINIC | Age: 45
End: 2020-11-23
Payer: COMMERCIAL

## 2020-11-23 VITALS
HEART RATE: 103 BPM | DIASTOLIC BLOOD PRESSURE: 84 MMHG | WEIGHT: 243 LBS | BODY MASS INDEX: 31.18 KG/M2 | HEIGHT: 74 IN | TEMPERATURE: 98.2 F | OXYGEN SATURATION: 97 % | SYSTOLIC BLOOD PRESSURE: 132 MMHG

## 2020-11-23 PROBLEM — Z13.1 SCREENING FOR DIABETES MELLITUS: Status: ACTIVE | Noted: 2020-11-23

## 2020-11-23 PROBLEM — D22.9 CHANGE IN SKIN MOLE: Status: ACTIVE | Noted: 2020-11-23

## 2020-11-23 PROBLEM — J34.89 NASAL OBSTRUCTION: Status: RESOLVED | Noted: 2020-03-17 | Resolved: 2020-11-23

## 2020-11-23 PROBLEM — K21.00 GASTROESOPHAGEAL REFLUX DISEASE WITH ESOPHAGITIS: Chronic | Status: RESOLVED | Noted: 2020-08-04 | Resolved: 2020-11-23

## 2020-11-23 PROBLEM — Z11.4 SCREENING FOR HIV (HUMAN IMMUNODEFICIENCY VIRUS): Status: ACTIVE | Noted: 2020-11-23

## 2020-11-23 PROBLEM — Z00.00 ANNUAL PHYSICAL EXAM: Status: ACTIVE | Noted: 2020-11-23

## 2020-11-23 PROBLEM — Z13.220 SCREENING FOR LIPID DISORDERS: Status: ACTIVE | Noted: 2020-11-23

## 2020-11-23 PROBLEM — E55.9 VITAMIN D DEFICIENCY: Status: ACTIVE | Noted: 2020-11-23

## 2020-11-23 PROBLEM — J34.2 DEVIATED NASAL SEPTUM: Chronic | Status: RESOLVED | Noted: 2020-03-09 | Resolved: 2020-11-23

## 2020-11-23 PROBLEM — J06.9 URI WITH COUGH AND CONGESTION: Status: RESOLVED | Noted: 2020-08-04 | Resolved: 2020-11-23

## 2020-11-23 PROBLEM — R53.82 CHRONIC FATIGUE: Status: RESOLVED | Noted: 2019-05-15 | Resolved: 2020-11-23

## 2020-11-23 PROCEDURE — 99386 PREV VISIT NEW AGE 40-64: CPT | Performed by: INTERNAL MEDICINE

## 2020-11-23 RX ORDER — LORATADINE 10 MG/1
10 TABLET ORAL DAILY
COMMUNITY
End: 2021-06-02 | Stop reason: SDUPTHER

## 2020-11-23 RX ORDER — CHOLECALCIFEROL (VITAMIN D3) 125 MCG
1 CAPSULE ORAL DAILY
Qty: 30 TABLET | Refills: 5 | Status: SHIPPED | OUTPATIENT
Start: 2020-11-23 | End: 2021-07-07 | Stop reason: SDUPTHER

## 2020-11-23 SDOH — HEALTH STABILITY: PHYSICAL HEALTH: ON AVERAGE, HOW MANY DAYS PER WEEK DO YOU ENGAGE IN MODERATE TO STRENUOUS EXERCISE (LIKE A BRISK WALK)?: 4 DAYS

## 2020-11-23 SDOH — HEALTH STABILITY: PHYSICAL HEALTH: ON AVERAGE, HOW MANY MINUTES DO YOU ENGAGE IN EXERCISE AT THIS LEVEL?: 40 MIN

## 2020-11-23 ASSESSMENT — ENCOUNTER SYMPTOMS
ROS SKIN COMMENTS: CHANGING MOLE
COUGH: 0
SORE THROAT: 0
VOMITING: 0
SHORTNESS OF BREATH: 0
SINUS PRESSURE: 0
EYE DISCHARGE: 0
WHEEZING: 0
NAUSEA: 0
TROUBLE SWALLOWING: 0
SINUS PAIN: 0
RHINORRHEA: 0
CHEST TIGHTNESS: 0
BLOOD IN STOOL: 0
EYE PAIN: 0
ABDOMINAL PAIN: 0

## 2020-11-23 NOTE — PROGRESS NOTES
Neurological: Negative for facial asymmetry, weakness, light-headedness, numbness and headaches. Psychiatric/Behavioral: Negative for confusion. Current Outpatient Medications on File Prior to Visit   Medication Sig Dispense Refill    loratadine (CLARITIN) 10 MG tablet Take 10 mg by mouth daily       No current facility-administered medications on file prior to visit. Allergies   Allergen Reactions    Other Itching     Coloring on pill specifically pink and yellow      Past Medical History:   Diagnosis Date    Change in skin mole 11/23/2020    DNS (deviated nasal septum) 3/9/2020    GERD (gastroesophageal reflux disease)     Headache     Nasal congestion     Nosebleed     Sub[facility] ([facility] [address]     Tinnitus      Past Surgical History:   Procedure Laterality Date    SEPTOPLASTY N/A 3/17/2020    PARTIAL RECONSTRUCTION OF LEFT INFERIOR TURBINATE, SEPTAL RECONSTRUCTION, LYSIS INTRANASAL SYNECHIA performed by Ramiro Dominguez MD at [address] [place] [address] History     Tobacco Use    Smoking status: Never Smoker    Smokeless tobacco: Never Used   Substance Use Topics    Alcohol use: Yes     Alcohol/week: 2.0 standard drinks     Types: 2 Glasses of wine per week     Comment: socially      Family History   Problem Relation Age of Onset    Other Brother     Cancer Paternal Grandmother         breast    Heart Disease Paternal Grandfather     High Blood Pressure Paternal Grandfather     High Cholesterol Paternal Grandfather     Heart Attack Paternal Grandfather     No Known Problems Mother     No Known Problems Father         Vitals:    11/23/20 1420   BP: 132/84   Pulse: 103   Temp: 98.2 °F (36.8 °C)   SpO2: 97%   Weight: 243 lb (110.2 kg)   Height: 6' 2\" (1.88 m)     Estimated body mass index is 31.2 kg/m² as calculated from the following:    Height as of this encounter: 6' 2\" (1.88 m). Weight as of this encounter: 243 lb (110.2 kg).     Physical Exam  Vitals signs and nursing note

## 2020-11-23 NOTE — PATIENT INSTRUCTIONS
Fasting blood work middle December at [place] [address] [place]. Vit d3 2000 u     tdap from pharmacy. Lose wt to 220 lb. DIET AND EXERCISE:    Try to do SCHEDULED 3 - 4 miles brisk walk or elliptical machine use at least 4 days a week and  Dumbbell 2-5 lb arm exercises--4 sets of 4 group arm muscles -- 4 days a week. Breakfast : 4 egg white omelet or scrambled eggs with bell pepper,onion,tomato,spinach etc or boiled eggs for breakfast.     Lunch : Deck of card size meat (baked, broiled or grilled ) with leafy vegetables - spinach / kale / mustard green / lettuce etc. for salad. Supper : [facility] [employer] grilled meats -deck of cards sized with 3/4th dinner plate full of vegetables -green bean or broccoli or cauliflower or carrots. If needed , buy bread 35 to 40 kcal- two slices at a time only and Tortillas 50- 90 kcal only at one meal.    Least or no bread, potato, pasta, highly processed foods, fried foods, sweets etc.    Discussed use, benefit, and side effects of prescribed medications. Barriers to compliance discussed. All patient questions answered. Pt voiced understanding. IF YOU NEED A PRESCRIPTION REFILL, THEN PLEASE GIVE US THREE WORKING DAYS TO REFILL A PRESCRIPTION.

## 2020-12-23 PROBLEM — Z00.00 ANNUAL PHYSICAL EXAM: Status: RESOLVED | Noted: 2020-11-23 | Resolved: 2020-12-23

## 2020-12-23 PROBLEM — Z13.220 SCREENING FOR LIPID DISORDERS: Status: RESOLVED | Noted: 2020-11-23 | Resolved: 2020-12-23

## 2020-12-23 PROBLEM — Z11.4 SCREENING FOR HIV (HUMAN IMMUNODEFICIENCY VIRUS): Status: RESOLVED | Noted: 2020-11-23 | Resolved: 2020-12-23

## 2020-12-23 PROBLEM — Z13.1 SCREENING FOR DIABETES MELLITUS: Status: RESOLVED | Noted: 2020-11-23 | Resolved: 2020-12-23

## 2021-05-12 ENCOUNTER — TELEPHONE (OUTPATIENT)
Dept: PRIMARY CARE CLINIC | Age: 46
End: 2021-05-12

## 2021-05-12 NOTE — TELEPHONE ENCOUNTER
Called patient about Covid-19 vaccine. Patient wants to read up on vaccine a bit more before deciding whether or not he wants the vaccine.

## 2021-06-01 ENCOUNTER — TELEPHONE (OUTPATIENT)
Dept: PRIMARY CARE CLINIC | Age: 46
End: 2021-06-01

## 2021-06-01 NOTE — TELEPHONE ENCOUNTER
Patient called about a refill on his omeprazole (PRILOSEC) 40 MG delayed release capsule. Dr. Ann-Marie Morrison has never filled this medication for him and he only takes it when needed. Marguerite is scheduled for an appointment on 6/2/21 and can discuss with Dr. Ann-Marie Morrison as well.

## 2021-06-02 ENCOUNTER — OFFICE VISIT (OUTPATIENT)
Dept: PRIMARY CARE CLINIC | Age: 46
End: 2021-06-02
Payer: COMMERCIAL

## 2021-06-02 VITALS
SYSTOLIC BLOOD PRESSURE: 122 MMHG | HEIGHT: 74 IN | RESPIRATION RATE: 16 BRPM | WEIGHT: 238 LBS | BODY MASS INDEX: 30.54 KG/M2 | DIASTOLIC BLOOD PRESSURE: 86 MMHG | HEART RATE: 88 BPM | OXYGEN SATURATION: 98 % | TEMPERATURE: 98.1 F

## 2021-06-02 DIAGNOSIS — R04.0 EPISTAXIS: ICD-10-CM

## 2021-06-02 DIAGNOSIS — K21.00 GASTROESOPHAGEAL REFLUX DISEASE WITH ESOPHAGITIS WITHOUT HEMORRHAGE: ICD-10-CM

## 2021-06-02 DIAGNOSIS — R10.10 PAIN OF UPPER ABDOMEN: ICD-10-CM

## 2021-06-02 DIAGNOSIS — J35.1 LARGE TONSILS: ICD-10-CM

## 2021-06-02 PROCEDURE — 99214 OFFICE O/P EST MOD 30 MIN: CPT | Performed by: INTERNAL MEDICINE

## 2021-06-02 RX ORDER — ECHINACEA PURPUREA EXTRACT 125 MG
1 TABLET ORAL 3 TIMES DAILY
Qty: 1 BOTTLE | Refills: 3 | COMMUNITY
Start: 2021-06-02 | End: 2021-10-20

## 2021-06-02 RX ORDER — LORATADINE 10 MG/1
10 TABLET ORAL DAILY
Qty: 90 TABLET | Refills: 1 | Status: SHIPPED | OUTPATIENT
Start: 2021-06-02 | End: 2021-09-29 | Stop reason: SDUPTHER

## 2021-06-02 RX ORDER — OMEPRAZOLE 20 MG/1
20 CAPSULE, DELAYED RELEASE ORAL
Qty: 90 CAPSULE | Refills: 1 | Status: SHIPPED | OUTPATIENT
Start: 2021-06-02 | End: 2021-09-29

## 2021-06-02 ASSESSMENT — PATIENT HEALTH QUESTIONNAIRE - PHQ9
SUM OF ALL RESPONSES TO PHQ QUESTIONS 1-9: 0
1. LITTLE INTEREST OR PLEASURE IN DOING THINGS: 0
SUM OF ALL RESPONSES TO PHQ9 QUESTIONS 1 & 2: 0
2. FEELING DOWN, DEPRESSED OR HOPELESS: 0

## 2021-06-02 ASSESSMENT — ENCOUNTER SYMPTOMS
HEMATOCHEZIA: 0
SORE THROAT: 0
SINUS PRESSURE: 0
BELCHING: 1
COUGH: 0
NAUSEA: 0
SINUS PAIN: 0
TROUBLE SWALLOWING: 0
ABDOMINAL PAIN: 1
SHORTNESS OF BREATH: 0
VOMITING: 0
FLATUS: 0
WHEEZING: 0
EYE DISCHARGE: 0
RHINORRHEA: 0
CONSTIPATION: 0
BLOOD IN STOOL: 0
DIARRHEA: 0
CHEST TIGHTNESS: 0
EYE PAIN: 0

## 2021-06-02 NOTE — PATIENT INSTRUCTIONS
Fasting blood work tomorrow morning at lab here    Loratadine 10 mg a day or maybe every other day. Saline nose spray 2-3 times a day and probably use humidifier with the dry heat always. Extensive counseling done to keep avoiding spicy, acidic tomato based foods or fatty foods like chocolate, citrus fruits (oranges, grapefruit etc.) and fruit juices. Limit intake of coffee, tea, alcohol and Juan to one a day after food only. Watch your weight. Being overweight increases intra-abdominal pressure - which can aggravate heartburn or reflux. Don't gorge yourself at meal time. Eat smaller meals. Wait for 2 hours for exercise after eating. No eating or drinking (not even water) for 3-4 hours before lying down. May elevate head of bed with blocks , if needed. No regular milk unless taking lactase tablets or regular ice cream with lactase tablets also. Upper scope with the gastroenterologist.    See the ENT specialist for nosebleed as well as enlarged tonsils    Discussed use, benefit, and side effects of prescribed medications. Barriers to compliance discussed. All patient questions answered. Pt voiced understanding. IF YOU NEED A PRESCRIPTION REFILL, THEN PLEASE GIVE US THREE WORKING DAYS TO REFILL A PRESCRIPTION. Office Hours to Answer Questions--Monday thru Thursday --9.00 AM to 4.00 PM    Please get all OVER DUE VACCINATIONS done at the pharmacy or health department as soon as possible.

## 2021-06-02 NOTE — PROGRESS NOTES
abdominal pain. Negative for anorexia, blood in stool, constipation, diarrhea, flatus, hematochezia, melena, nausea and vomiting. Endocrine: Negative for polydipsia and polyphagia. Genitourinary: Negative for difficulty urinating, dysuria, enuresis, flank pain, frequency and hematuria. Musculoskeletal: Negative for arthralgias and myalgias. Skin: Negative for rash. Neurological: Negative for facial asymmetry, weakness, light-headedness, numbness and headaches. Psychiatric/Behavioral: Negative for confusion. Current Outpatient Medications on File Prior to Visit   Medication Sig Dispense Refill    Cholecalciferol (VITAMIN D3) 50 MCG (2000 UT) TABS Take 1 tablet by mouth daily 30 tablet 5     No current facility-administered medications on file prior to visit. Past Medical History:   Diagnosis Date    Change in skin mole 11/23/2020    DNS (deviated nasal septum) 3/9/2020    GERD (gastroesophageal reflux disease)     Headache     Nasal congestion     Nosebleed     Substance abuse ([facility])     Tinnitus       Social History     Tobacco Use    Smoking status: Never Smoker    Smokeless tobacco: Never Used   Substance Use Topics    Alcohol use:  Yes     Alcohol/week: 2.0 standard drinks     Types: 2 Glasses of wine per week     Comment: socially      Family History   Problem Relation Age of Onset   [employer] Mother 67        breast    Other Father 43        schizophrenia-assisted care    Other Brother     Cancer Paternal Grandmother         breast    Heart Disease Paternal Grandfather     High Blood Pressure Paternal Grandfather     High Cholesterol Paternal Grandfather     Heart Attack Paternal Grandfather         Vitals:    06/02/21 1300   BP: 122/86   Site: Left Upper Arm   Position: Sitting   Cuff Size: Medium Adult   Pulse: 88   Resp: 16   Temp: 98.1 °F (36.7 °C)   SpO2: 98%   Weight: 238 lb (108 kg)   Height: 6' 2\" (1.88 m)     Estimated body mass index is 30.56 kg/m² as calculated upper abdomen    - CBC Auto Differential; Future  - Protime-INR; Future  - APTT; Future  - Amylase; Future  - Lipase; Future  - NINFA - Afua Dillon MD, Gastroenterology, [facility]    2. [address] [place] [address]; Future  - APTT; Future  - Marion Florentino MD, Otolaryngology, [facility]  - sodium chloride (ALTAMIST SPRAY) 0.65 % nasal spray; 1 spray by Nasal route 3 times daily  Dispense: 1 Bottle; Refill: 3  - loratadine (CLARITIN) 10 MG tablet; Take 1 tablet by mouth daily  Dispense: 90 tablet; Refill: 1    3. Gastroesophageal reflux disease with esophagitis without hemorrhage    - NINFA - Afua Dillon MD, Gastroenterology, [facility]  - omeprazole (PRILOSEC) 20 MG delayed release capsule; Take 1 capsule by mouth every morning (before breakfast)  Dispense: 90 capsule; Refill: 1    4. David Thompson MD, Otolaryngology, [facility]      Return in about 1 week (around 6/9/2021) for Lab results. Patient Instructions   Fasting blood work tomorrow morning at lab here    Loratadine 10 mg a day or maybe every other day. Saline nose spray 2-3 times a day and probably use humidifier with the dry heat always. Extensive counseling done to keep avoiding spicy, acidic tomato based foods or fatty foods like chocolate, citrus fruits (oranges, grapefruit etc.) and fruit juices. Limit intake of coffee, tea, alcohol and Juan to one a day after food only. Watch your weight. Being overweight increases intra-abdominal pressure - which can aggravate heartburn or reflux. Don't gorge yourself at meal time. Eat smaller meals. Wait for 2 hours for exercise after eating. No eating or drinking (not even water) for 3-4 hours before lying down. May elevate head of bed with blocks , if needed. No regular milk unless taking lactase tablets or regular ice cream with lactase tablets also.     Upper scope with the gastroenterologist.    See the ENT specialist for nosebleed as well as enlarged tonsils    Discussed use, benefit, and side effects of prescribed medications. Barriers to compliance discussed. All patient questions answered. Pt voiced understanding. IF YOU NEED A PRESCRIPTION REFILL, THEN PLEASE GIVE US THREE WORKING DAYS TO REFILL A PRESCRIPTION. Office Hours to Answer Questions--Monday thru Thursday --9.00 AM to 4.00 PM    Please get all OVER DUE VACCINATIONS done at the pharmacy or health department as soon as possible. Electronically signed by Ceferino Carballo MD on 6/2/2021 at 1:30 PM     This dictation was generated by voice recognition computer software. Although all attempts are made to edit the dictation for accuracy, there may be errors in the transcription that are not intended.

## 2021-06-03 DIAGNOSIS — Z11.4 SCREENING FOR HIV (HUMAN IMMUNODEFICIENCY VIRUS): ICD-10-CM

## 2021-06-03 DIAGNOSIS — Z13.1 SCREENING FOR DIABETES MELLITUS: ICD-10-CM

## 2021-06-03 DIAGNOSIS — Z13.220 SCREENING FOR LIPID DISORDERS: ICD-10-CM

## 2021-06-03 DIAGNOSIS — R10.10 PAIN OF UPPER ABDOMEN: ICD-10-CM

## 2021-06-03 DIAGNOSIS — E55.9 VITAMIN D DEFICIENCY: ICD-10-CM

## 2021-06-03 DIAGNOSIS — R04.0 EPISTAXIS: ICD-10-CM

## 2021-06-03 LAB
A/G RATIO: 1.4 (ref 1.1–2.2)
ALBUMIN SERPL-MCNC: 4.4 G/DL (ref 3.4–5)
ALP BLD-CCNC: 73 U/L (ref 40–129)
ALT SERPL-CCNC: 20 U/L (ref 10–40)
AMYLASE: 74 U/L (ref 25–115)
ANION GAP SERPL CALCULATED.3IONS-SCNC: 14 MMOL/L (ref 3–16)
APTT: 29.6 SEC (ref 24.2–36.2)
AST SERPL-CCNC: 19 U/L (ref 15–37)
BASOPHILS ABSOLUTE: 0 K/UL (ref 0–0.2)
BASOPHILS RELATIVE PERCENT: 0.6 %
BILIRUB SERPL-MCNC: 0.3 MG/DL (ref 0–1)
BUN BLDV-MCNC: 28 MG/DL (ref 7–20)
CALCIUM SERPL-MCNC: 9.3 MG/DL (ref 8.3–10.6)
CHLORIDE BLD-SCNC: 102 MMOL/L (ref 99–110)
CHOLESTEROL, TOTAL: 233 MG/DL (ref 0–199)
CO2: 23 MMOL/L (ref 21–32)
CREAT SERPL-MCNC: 1.2 MG/DL (ref 0.9–1.3)
EOSINOPHILS ABSOLUTE: 0.2 K/UL (ref 0–0.6)
EOSINOPHILS RELATIVE PERCENT: 2.7 %
GFR AFRICAN AMERICAN: >60
GFR NON-AFRICAN AMERICAN: >60
GLOBULIN: 3.2 G/DL
GLUCOSE BLD-MCNC: 82 MG/DL (ref 70–99)
HCT VFR BLD CALC: 48.5 % (ref 40.5–52.5)
HDLC SERPL-MCNC: 38 MG/DL (ref 40–60)
HEMOGLOBIN: 16.9 G/DL (ref 13.5–17.5)
INR BLD: 0.99 (ref 0.86–1.14)
LDL CHOLESTEROL CALCULATED: 142 MG/DL
LIPASE: 49 U/L (ref 13–60)
LYMPHOCYTES ABSOLUTE: 2.5 K/UL (ref 1–5.1)
LYMPHOCYTES RELATIVE PERCENT: 34.5 %
MCH RBC QN AUTO: 31.9 PG (ref 26–34)
MCHC RBC AUTO-ENTMCNC: 34.9 G/DL (ref 31–36)
MCV RBC AUTO: 91.5 FL (ref 80–100)
MONOCYTES ABSOLUTE: 0.6 K/UL (ref 0–1.3)
MONOCYTES RELATIVE PERCENT: 8.6 %
NEUTROPHILS ABSOLUTE: 3.8 K/UL (ref 1.7–7.7)
NEUTROPHILS RELATIVE PERCENT: 53.6 %
PDW BLD-RTO: 13.9 % (ref 12.4–15.4)
PLATELET # BLD: 220 K/UL (ref 135–450)
PMV BLD AUTO: 8.6 FL (ref 5–10.5)
POTASSIUM SERPL-SCNC: 4.8 MMOL/L (ref 3.5–5.1)
PROTHROMBIN TIME: 11.5 SEC (ref 10–13.2)
RBC # BLD: 5.3 M/UL (ref 4.2–5.9)
SODIUM BLD-SCNC: 139 MMOL/L (ref 136–145)
TOTAL PROTEIN: 7.6 G/DL (ref 6.4–8.2)
TRIGL SERPL-MCNC: 265 MG/DL (ref 0–150)
VITAMIN D 25-HYDROXY: 37.2 NG/ML
VLDLC SERPL CALC-MCNC: 53 MG/DL
WBC # BLD: 7.1 K/UL (ref 4–11)

## 2021-06-04 LAB
HIV AG/AB: NORMAL
HIV ANTIGEN: NORMAL
HIV-1 ANTIBODY: NORMAL
HIV-2 AB: NORMAL

## 2021-06-08 ENCOUNTER — OFFICE VISIT (OUTPATIENT)
Dept: PRIMARY CARE CLINIC | Age: 46
End: 2021-06-08
Payer: COMMERCIAL

## 2021-06-08 VITALS
TEMPERATURE: 98.3 F | DIASTOLIC BLOOD PRESSURE: 78 MMHG | HEART RATE: 90 BPM | WEIGHT: 236.8 LBS | SYSTOLIC BLOOD PRESSURE: 122 MMHG | HEIGHT: 74 IN | BODY MASS INDEX: 30.39 KG/M2 | OXYGEN SATURATION: 97 % | RESPIRATION RATE: 16 BRPM

## 2021-06-08 DIAGNOSIS — E78.2 MIXED HYPERLIPIDEMIA: ICD-10-CM

## 2021-06-08 DIAGNOSIS — E55.9 VITAMIN D DEFICIENCY: ICD-10-CM

## 2021-06-08 DIAGNOSIS — J30.1 SEASONAL ALLERGIC RHINITIS DUE TO POLLEN: ICD-10-CM

## 2021-06-08 DIAGNOSIS — E86.0 DEHYDRATION: ICD-10-CM

## 2021-06-08 DIAGNOSIS — R04.0 EPISTAXIS: Primary | ICD-10-CM

## 2021-06-08 PROBLEM — R10.10 PAIN OF UPPER ABDOMEN: Status: RESOLVED | Noted: 2021-06-02 | Resolved: 2021-06-08

## 2021-06-08 PROCEDURE — 99213 OFFICE O/P EST LOW 20 MIN: CPT | Performed by: INTERNAL MEDICINE

## 2021-06-08 RX ORDER — MONTELUKAST SODIUM 10 MG/1
10 TABLET ORAL NIGHTLY
Qty: 30 TABLET | Refills: 0 | Status: SHIPPED | OUTPATIENT
Start: 2021-06-08 | End: 2021-07-07 | Stop reason: SDUPTHER

## 2021-06-08 RX ORDER — CEPHALEXIN 500 MG/1
500 CAPSULE ORAL 3 TIMES DAILY
Qty: 30 CAPSULE | Refills: 0 | Status: SHIPPED | OUTPATIENT
Start: 2021-06-08 | End: 2021-06-22 | Stop reason: SDUPTHER

## 2021-06-08 ASSESSMENT — ENCOUNTER SYMPTOMS
NAUSEA: 0
COUGH: 0
TROUBLE SWALLOWING: 0
ABDOMINAL PAIN: 0
BLOOD IN STOOL: 0
VOMITING: 0
SINUS PRESSURE: 0
SORE THROAT: 0
WHEEZING: 0
EYE PAIN: 0
EYE DISCHARGE: 0
SINUS PAIN: 0
SHORTNESS OF BREATH: 0
RHINORRHEA: 0
CHEST TIGHTNESS: 0

## 2021-06-08 NOTE — PROGRESS NOTES
2021     Jani Gonzalez (: 1975) is a 41 y.o. male, here for evaluation of the following medical concerns:    Chief Complaint   Patient presents with    Results        Orders Only on 2021 breakfast he had a juice and that is not good because that has a lot of sugar in there. he should rather eat fruit and lunch was turkey sandwich yesterday but he put cheese and had pizza at night    Despite loratadine he still has a runny nose. He still get postnasal drainage and feels that his stomach and upset stomach. Lipase                                        Date: 2021  Value: 49.0        Ref range: 13.0 - 60.0 U/L    Status: Final  Amylase                                       Date: 2021  Value: 74          Ref range: 25 - 115 U/L       Status: Final  aPTT                                          Date: 2021  Value: 29.6        Ref range: 24.2 - 36.2 sec    Status: Final                Comment: Therapeutic range: 49.0 - 76.0 sec    Effective 19 9:00am EST  Please note reference ranges have  changed for PTT Testing. Protime                                       Date: 2021  Value: 11.5        Ref range: 10.0 - 13.2 sec    Status: Final                Comment: Effective 19 09:00am EST  Please note reference ranges have  changed for PT and INR Testing. INR                                           Date: 2021  Value: 0.99        Ref range: 0.86 - 1.14        Status: Final                Comment: Effective 19 at 09:00am EST    Normal: 0.86 - 1.14  Therapeutic: 2.0 - 3.0  Pros.  Valve: 2.5 - 3.5  AMI: 2.0 - 3.0    WBC                                           Date: 2021  Value: 7.1         Ref range: 4.0 - 11.0 K/uL    Status: Final  RBC                                           Date: 2021  Value: 5.30        Ref range: 4.20 - 5.90 M/uL   Status: Final  Hemoglobin                                    Date: 2021  Value: 16.9        Ref range: 13.5 - 17.5 g/dL   Status: Final  Hematocrit                                    Date: 06/03/2021  Value: 48.5        Ref range: 40.5 - 52.5 %      Status: Final  MCV                                           Date: 06/03/2021  Value: 91.5        Ref range: 80.0 - 100.0 fL    Status: Final  MCH                                           Date: 06/03/2021  Value: 31.9        Ref range: 26.0 - 34.0 pg     Status: Final  MCHC                                          Date: 06/03/2021  Value: 34.9        Ref range: 31.0 - 36.0 g/dL   Status: Final  RDW                                           Date: 06/03/2021  Value: 13.9        Ref range: 12.4 - 15.4 %      Status: Final  Platelets                                     Date: 06/03/2021  Value: 220         Ref range: 135 - 450 K/uL     Status: Final  MPV                                           Date: 06/03/2021  Value: 8.6         Ref range: 5.0 - 10.5 fL      Status: Final  Neutrophils %                                 Date: 06/03/2021  Value: 53.6        Ref range: %                  Status: Final  Lymphocytes %                                 Date: 06/03/2021  Value: 34.5        Ref range: %                  Status: Final  Monocytes %                                   Date: 06/03/2021  Value: 8.6         Ref range: %                  Status: Final  Eosinophils %                                 Date: 06/03/2021  Value: 2.7         Ref range: %                  Status: Final  Basophils %                                   Date: 06/03/2021  Value: 0.6         Ref range: %                  Status: Final  Neutrophils Absolute                          Date: 06/03/2021  Value: 3.8         Ref range: 1.7 - 7.7 K/uL     Status: Final  Lymphocytes Absolute                          Date: 06/03/2021  Value: 2.5         Ref range: 1.0 - 5.1 K/uL     Status: Final  Monocytes Absolute                            Date: 06/03/2021  Value: 0.6         Ref range: 0.0 - 1.3 K/uL Status: Final  Eosinophils Absolute                          Date: 06/03/2021  Value: 0.2         Ref range: 0.0 - 0.6 K/uL     Status: Final  Basophils Absolute                            Date: 06/03/2021  Value: 0.0         Ref range: 0.0 - 0.2 K/uL     Status: Final  Vit D, 25-Hydroxy                             Date: 06/03/2021  Value: 37.2        Ref range: >=30 ng/mL         Status: Final                Comment: <=20 ng/mL. ........... Stephanie Sida Deficient  21-29 ng/mL. ......... Stephanie Sida Insufficient  >=30 ng/mL. ........ Stephanie Sida Sufficient    Sodium                                        Date: 06/03/2021  Value: 139         Ref range: 136 - 145 mmol/L   Status: Final  Potassium                                     Date: 06/03/2021  Value: 4.8         Ref range: 3.5 - 5.1 mmol/L   Status: Final  Chloride                                      Date: 06/03/2021  Value: 102         Ref range: 99 - 110 mmol/L    Status: Final  CO2                                           Date: 06/03/2021  Value: 23          Ref range: 21 - 32 mmol/L     Status: Final  Anion Gap                                     Date: 06/03/2021  Value: 14          Ref range: 3 - 16             Status: Final  Glucose                                       Date: 06/03/2021  Value: 82          Ref range: 70 - 99 mg/dL      Status: Final  BUN                                           Date: 06/03/2021  Value: 28*         Ref range: 7 - 20 mg/dL       Status: Final  CREATININE                                    Date: 06/03/2021  Value: 1.2         Ref range: 0.9 - 1.3 mg/dL    Status: Final  GFR Non-                      Date: 06/03/2021  Value: >60         Ref range: >60                Status: Final                Comment: >60 mL/min/1.73m2 EGFR, calc. for ages 25 and older using the  MDRD formula (not corrected for weight), is valid for stable  renal function.     GFR                           Date: 06/03/2021  Value: >60         Ref range: >60 Status: Final                Comment: Chronic Kidney Disease: less than 60 ml/min/1.73 sq.m. Kidney Failure: less than 15 ml/min/1.73 sq.m. Results valid for patients 18 years and older.     Calcium                                       Date: 06/03/2021  Value: 9.3         Ref range: 8.3 - 10.6 mg/dL   Status: Final  Total Protein                                 Date: 06/03/2021  Value: 7.6         Ref range: 6.4 - 8.2 g/dL     Status: Final  Albumin                                       Date: 06/03/2021  Value: 4.4         Ref range: 3.4 - 5.0 g/dL     Status: Final  Albumin/Globulin Ratio                        Date: 06/03/2021  Value: 1.4         Ref range: 1.1 - 2.2          Status: Final  Total Bilirubin                               Date: 06/03/2021  Value: 0.3         Ref range: 0.0 - 1.0 mg/dL    Status: Final  Alkaline Phosphatase                          Date: 06/03/2021  Value: 73          Ref range: 40 - 129 U/L       Status: Final  ALT                                           Date: 06/03/2021  Value: 20          Ref range: 10 - 40 U/L        Status: Final  AST                                           Date: 06/03/2021  Value: 19          Ref range: 15 - 37 U/L        Status: Final  Globulin                                      Date: 06/03/2021  Value: 3.2         Ref range: g/dL               Status: Final  Cholesterol, Total                            Date: 06/03/2021  Value: 233*        Ref range: 0 - 199 mg/dL      Status: Final  Triglycerides                                 Date: 06/03/2021  Value: 265*        Ref range: 0 - 150 mg/dL      Status: Final  HDL                                           Date: 06/03/2021  Value: 38*         Ref range: 40 - 60 mg/dL      Status: Final  LDL Calculated                                Date: 06/03/2021  Value: 142*        Ref range: <100 mg/dL         Status: Final  VLDL Cholesterol Calculated                   Date: 06/03/2021  Value: 53 Ref range: Not Established *  Status: Final  HIV Ag/Ab                                     Date: 06/03/2021  Value: Non-Reactive                     Ref range: Non-reactive       Status: Final  HIV-1 Antibody                                Date: 06/03/2021  Value: Non-Reactive                     Ref range: Non-reactive       Status: Final  HIV ANTIGEN                                   Date: 06/03/2021  Value: Non-Reactive                     Ref range: Non-reactive       Status: Final  HIV-2 Ab                                      Date: 06/03/2021  Value: Non-Reactive                     Ref range: Non-reactive       Status: Final  ------------        Review of Systems   Constitutional: Negative for appetite change, chills, fever and unexpected weight change. HENT: Positive for nosebleeds. Negative for congestion, ear discharge, ear pain, rhinorrhea, sinus pressure, sinus pain, sore throat and trouble swallowing. Eyes: Negative for pain and discharge. Respiratory: Negative for cough, chest tightness, shortness of breath and wheezing. Cardiovascular: Negative for chest pain, palpitations and leg swelling. Gastrointestinal: Negative for abdominal pain, blood in stool, nausea and vomiting. Fullness of stomach   Endocrine: Negative for polydipsia and polyphagia. Genitourinary: Negative for difficulty urinating, enuresis, flank pain and hematuria. Musculoskeletal: Negative for myalgias. Skin: Negative for rash. Neurological: Negative for facial asymmetry, weakness, light-headedness, numbness and headaches. Psychiatric/Behavioral: Negative for confusion.        Current Outpatient Medications on File Prior to Visit   Medication Sig Dispense Refill    sodium chloride (ALTAMIST SPRAY) 0.65 % nasal spray 1 spray by Nasal route 3 times daily 1 Bottle 3    loratadine (CLARITIN) 10 MG tablet Take 1 tablet by mouth daily 90 tablet 1    omeprazole (PRILOSEC) 20 MG delayed release capsule Take 1 capsule by mouth every morning (before breakfast) 90 capsule 1    Cholecalciferol (VITAMIN D3) 50 MCG (2000 UT) TABS Take 1 tablet by mouth daily 30 tablet 5     No current facility-administered medications on file prior to visit. Past Medical History:   Diagnosis Date    Change in skin mole 11/23/2020    DNS (deviated nasal septum) 3/9/2020    GERD (gastroesophageal reflux disease)     Headache     Nasal congestion     Nosebleed     Seasonal allergic rhinitis due to pollen 6/8/2021    Substance abuse ([facility] [address]     Tinnitus       Social History     Tobacco Use    Smoking status: Never Smoker    Smokeless tobacco: Never Used   Substance Use Topics    Alcohol use: Yes     Alcohol/week: 2.0 standard drinks     Types: 2 Glasses of wine per week     Comment: socially      Family History   Problem Relation Age of Onset    Cancer Mother 67        breast    Heart Disease Father 58    Other Father 43        schizophrenia-assisted care    Other Brother     Cancer Paternal Grandmother         breast    Heart Disease Paternal Grandfather     High Blood Pressure Paternal Grandfather     High Cholesterol Paternal Grandfather     Heart Attack Paternal Grandfather         Vitals:    06/08/21 1307   BP: 122/78   Site: Left Upper Arm   Position: Sitting   Cuff Size: Large Adult   Pulse: 90   Resp: 16   Temp: 98.3 °F (36.8 °C)   SpO2: 97%   Weight: 236 lb 12.8 oz (107.4 kg)   Height: 6' 2\" (1.88 m)     Estimated body mass index is 30.4 kg/m² as calculated from the following:    Height as of this encounter: 6' 2\" (1.88 m). Weight as of this encounter: 236 lb 12.8 oz (107.4 kg). Physical Exam  Vitals and nursing note reviewed. Constitutional:       General: He is not in acute distress. HENT:      Head: Normocephalic and atraumatic. Right Ear: External ear normal.      Left Ear: External ear normal.      Nose: Congestion present.    Eyes:      General: Lids are normal.      Conjunctiva/sclera: Conjunctivae normal.      Pupils: Pupils are equal, round, and reactive to light. Neck:      Thyroid: No thyromegaly. Vascular: No JVD. Trachea: No tracheal deviation. Cardiovascular:      Rate and Rhythm: Normal rate and regular rhythm. Heart sounds: Normal heart sounds. No gallop. Pulmonary:      Effort: Pulmonary effort is normal. No respiratory distress. Breath sounds: Normal breath sounds. No wheezing or rales. Abdominal:      General: Bowel sounds are normal.      Palpations: Abdomen is soft. There is no mass. Tenderness: There is no abdominal tenderness. Musculoskeletal:         General: No tenderness. Cervical back: Neck supple. Comments: No leg edema or calf tenderness   Lymphadenopathy:      Cervical: No cervical adenopathy. Skin:     General: Skin is warm and dry. Findings: No rash. Neurological:      General: No focal deficit present. Mental Status: He is alert and oriented to person, place, and time. Cranial Nerves: No cranial nerve deficit. Sensory: No sensory deficit. Psychiatric:         Mood and Affect: Mood normal.         Behavior: Behavior normal.         Thought Content: Thought content normal.         Judgment: Judgment normal.         ASSESSMENT/PLAN:  1. Epistaxis    - cephALEXin (KEFLEX) 500 MG capsule; Take 1 capsule by mouth 3 times daily for 10 days  Dispense: 30 capsule; Refill: 0    2. Vitamin D deficiency  Keep vitamin D same    3. Mixed hyperlipidemia  Low-fat diet teaching done with lean meats a lot of vegetables and keep losing weight    4. Seasonal allergic rhinitis due to pollen    - montelukast (SINGULAIR) 10 MG tablet; Take 1 tablet by mouth nightly  Dispense: 30 tablet; Refill: 0    5. Dehydration  Need more water than his pop      Return in about 2 weeks (around 6/22/2021) for epistaxis. Patient Instructions   Avoid blowing the nose hard. Cephalexin 500 mg 3 times a day for 10 days.     Yogurt every

## 2021-06-08 NOTE — PATIENT INSTRUCTIONS
Avoid blowing the nose hard. Cephalexin 500 mg 3 times a day for 10 days. Yogurt every day. Add montelukast to loratadine 10 mg a day. Avoid tomatoes or oranges or acid take foods or tomato sauce containing foods. Keep stay away from pop. Need to drink 64 to 80 ounces water a day from 8 AM to 6 PM to help with the dehydration  Discussed use, benefit, and side effects of prescribed medications. Barriers to compliance discussed. All patient questions answered. Pt voiced understanding. IF YOU NEED A PRESCRIPTION REFILL, THEN PLEASE GIVE US THREE WORKING DAYS TO REFILL A PRESCRIPTION. Office Hours to Answer Questions--Monday thru Thursday --9.00 AM to 4.00 PM    Please get all OVER DUE VACCINATIONS done at the pharmacy or health department as soon as possible.

## 2021-06-22 ENCOUNTER — OFFICE VISIT (OUTPATIENT)
Dept: PRIMARY CARE CLINIC | Age: 46
End: 2021-06-22
Payer: COMMERCIAL

## 2021-06-22 VITALS
HEIGHT: 74 IN | BODY MASS INDEX: 30.34 KG/M2 | HEART RATE: 76 BPM | WEIGHT: 236.4 LBS | RESPIRATION RATE: 17 BRPM | OXYGEN SATURATION: 96 % | SYSTOLIC BLOOD PRESSURE: 118 MMHG | TEMPERATURE: 98.1 F | DIASTOLIC BLOOD PRESSURE: 80 MMHG

## 2021-06-22 DIAGNOSIS — E86.0 DEHYDRATION: ICD-10-CM

## 2021-06-22 DIAGNOSIS — E78.2 MIXED HYPERLIPIDEMIA: ICD-10-CM

## 2021-06-22 DIAGNOSIS — J01.00 ACUTE NON-RECURRENT MAXILLARY SINUSITIS: Primary | ICD-10-CM

## 2021-06-22 PROBLEM — R04.0 EPISTAXIS: Status: RESOLVED | Noted: 2021-06-02 | Resolved: 2021-06-22

## 2021-06-22 PROBLEM — J01.90 ACUTE NON-RECURRENT SINUSITIS: Status: ACTIVE | Noted: 2021-06-22

## 2021-06-22 PROBLEM — K21.00 GASTROESOPHAGEAL REFLUX DISEASE WITH ESOPHAGITIS WITHOUT HEMORRHAGE: Status: RESOLVED | Noted: 2021-06-02 | Resolved: 2021-06-22

## 2021-06-22 PROCEDURE — 99213 OFFICE O/P EST LOW 20 MIN: CPT | Performed by: INTERNAL MEDICINE

## 2021-06-22 RX ORDER — CEPHALEXIN 500 MG/1
500 CAPSULE ORAL 3 TIMES DAILY
Qty: 30 CAPSULE | Refills: 0 | Status: SHIPPED | OUTPATIENT
Start: 2021-06-22 | End: 2021-07-02

## 2021-06-22 ASSESSMENT — ENCOUNTER SYMPTOMS
COUGH: 0
EYE PAIN: 0
CHEST TIGHTNESS: 0
WHEEZING: 0
SINUS PAIN: 0
SHORTNESS OF BREATH: 0
BLOOD IN STOOL: 0
VOMITING: 0
EYE DISCHARGE: 0
ABDOMINAL PAIN: 0
SINUS PRESSURE: 0
SORE THROAT: 0
TROUBLE SWALLOWING: 0
RHINORRHEA: 0
NAUSEA: 0

## 2021-06-22 NOTE — PATIENT INSTRUCTIONS
AntiBiotics and yogurt and drink a lot of fluids. 64 ounces water or propel from 8 AM to 6 PM.    Extensive counseling done regarding DIET AND EXERCISE to lose weight to avoid morbidities associated with overweight. Try to do SCHEDULED 3 - 4 miles brisk walk or elliptical machine use at least 4 days a week and  Dumbbell 2-5 lb arm exercises--4 sets of 4 group arm muscles -- 4 days a week. Breakfast : 4 egg white omelet or scrambled eggs with bell pepper,onion,tomato,spinach etc or boiled eggs for breakfast.     Lunch : Deck of card size meat (baked, broiled or grilled ) with leafy vegetables - spinach / kale / mustard green / lettuce etc. for salad. Supper : Italo Pierce grilled meats -deck of cards sized with 3/4th dinner plate full of vegetables -green bean or broccoli or cauliflower or carrots. If needed , buy bread 35 to 40 kcal- two slices at a time only and Tortillas 50- 90 kcal only at one meal.    Least or no bread, potato, pasta, highly processed foods, fried foods, sweets etc.  Discussed use, benefit, and side effects of prescribed medications. Barriers to compliance discussed. All patient questions answered. Pt voiced understanding. IF YOU NEED A PRESCRIPTION REFILL, THEN PLEASE GIVE US THREE WORKING DAYS TO REFILL A PRESCRIPTION. Office Hours to Answer Questions--Monday thru Thursday --9.00 AM to 4.00 PM    Please get all OVER DUE VACCINATIONS done at the pharmacy or health department as soon as possible.

## 2021-06-22 NOTE — PROGRESS NOTES
2021     Vita Boland (: 1975) is a 41 y.o. male, here for evaluation of the following medical concerns:    Chief Complaint   Patient presents with   Jose Meyer     doing better since on antibiotics. Sinusitis-he is doing much better and able to breathe much better and  antibiotics has made a lot of difference and no more sinus bleeding now and his stomach is much better as phlegm is not going down the throat into the stomach and the 80% better and probably need more antibiotic to take it out completely. Dehydration wise he has been trying to drink more water. Hyperlipidemia    he has been watching low fat diet. Reminded to keep doing regular exercise 3 to 4 days a week. Must keep trying to lose weight. The available labs reviewed and analyzed and independent interpretation of the results explained at length. Lab Results       Component                Value               Date                       CHOL                     233 (H)             2021                 TRIG                     265 (H)             2021                 HDL                      38 (L)              2021                 ALT                      20                  2021                 AST                      19                  2021                Review of Systems   Constitutional: Negative for appetite change, chills, fever and unexpected weight change. HENT: Positive for postnasal drip. Negative for congestion, ear discharge, ear pain, nosebleeds, rhinorrhea, sinus pressure, sinus pain, sore throat and trouble swallowing. Eyes: Negative for pain and discharge. Respiratory: Negative for cough, chest tightness, shortness of breath and wheezing. Cardiovascular: Negative for chest pain, palpitations and leg swelling. Gastrointestinal: Negative for abdominal pain, blood in stool, nausea and vomiting. Endocrine: Negative for polydipsia and polyphagia. Genitourinary: Negative for difficulty urinating, enuresis, flank pain and hematuria. Musculoskeletal: Negative for myalgias. Skin: Negative for rash. Neurological: Negative for facial asymmetry, weakness, light-headedness, numbness and headaches. Psychiatric/Behavioral: Negative for confusion. Current Outpatient Medications on File Prior to Visit   Medication Sig Dispense Refill    montelukast (SINGULAIR) 10 MG tablet Take 1 tablet by mouth nightly 30 tablet 0    sodium chloride (ALTAMIST SPRAY) 0.65 % nasal spray 1 spray by Nasal route 3 times daily 1 Bottle 3    loratadine (CLARITIN) 10 MG tablet Take 1 tablet by mouth daily 90 tablet 1    omeprazole (PRILOSEC) 20 MG delayed release capsule Take 1 capsule by mouth every morning (before breakfast) 90 capsule 1    Cholecalciferol (VITAMIN D3) 50 MCG (2000 UT) TABS Take 1 tablet by mouth daily 30 tablet 5     No current facility-administered medications on file prior to visit. Past Medical History:   Diagnosis Date    Acute non-recurrent sinusitis 6/22/2021    Change in skin mole 11/23/2020    DNS (deviated nasal septum) 3/9/2020    Epistaxis 6/2/2021    Gastroesophageal reflux disease with esophagitis without hemorrhage 6/2/2021    GERD (gastroesophageal reflux disease)     Headache     Nasal congestion     Nosebleed     Seasonal allergic rhinitis due to pollen 6/8/2021    [facility] abuse ([facility] [address]     Tinnitus       Social History     Tobacco Use    Smoking status: Never Smoker    Smokeless tobacco: Never Used   Substance Use Topics    Alcohol use:  Yes     Alcohol/week: 2.0 standard drinks     Types: 2 Glasses of wine per week     Comment: socially      Family History   Problem Relation Age of Onset    Cancer Mother 67        breast    Heart Disease Father 58    Other Father 43        schizophrenia-assisted care    Other Brother     Cancer Paternal Grandmother         breast    Heart Disease Paternal Grandfather     High Blood Pressure Paternal Grandfather     High Cholesterol Paternal Grandfather     Heart Attack Paternal Grandfather         Vitals:    06/22/21 1427   BP: 118/80   Site: Left Upper Arm   Position: Sitting   Cuff Size: Medium Adult   Pulse: 76   Resp: 17   Temp: 98.1 °F (36.7 °C)   SpO2: 96%   Weight: 236 lb 6.4 oz (107.2 kg)   Height: 6' 2\" (1.88 m)     Estimated body mass index is 30.35 kg/m² as calculated from the following:    Height as of this encounter: 6' 2\" (1.88 m). Weight as of this encounter: 236 lb 6.4 oz (107.2 kg). Physical Exam  Vitals and nursing note reviewed. Constitutional:       General: He is not in acute distress. HENT:      Head: Normocephalic and atraumatic. Right Ear: Tympanic membrane, ear canal and external ear normal.      Left Ear: Tympanic membrane, ear canal and external ear normal.      Nose: Congestion present. Comments: Nasal turbinates mild erythema still present and thickening of turbinates     Mouth/Throat:      Pharynx: Posterior oropharyngeal erythema (  Erythema much better in the tonsil shrinking 2.) present. Eyes:      General: Lids are normal.      Conjunctiva/sclera: Conjunctivae normal.      Pupils: Pupils are equal, round, and reactive to light. Neck:      Thyroid: No thyromegaly. Vascular: No JVD. Trachea: No tracheal deviation. Cardiovascular:      Rate and Rhythm: Normal rate and regular rhythm. Heart sounds: Normal heart sounds. No gallop. Pulmonary:      Effort: Pulmonary effort is normal. No respiratory distress. Breath sounds: Normal breath sounds. No wheezing or rales. Abdominal:      General: Bowel sounds are normal.      Palpations: Abdomen is soft. There is no mass. Tenderness: There is no abdominal tenderness. Musculoskeletal:         General: No tenderness. Cervical back: Neck supple. Comments: No leg edema or calf tenderness   Lymphadenopathy:      Cervical: No cervical adenopathy. Skin:     General: Skin is warm and dry. Findings: No rash. Neurological:      General: No focal deficit present. Mental Status: He is alert and oriented to person, place, and time. Cranial Nerves: No cranial nerve deficit. Sensory: No sensory deficit. Psychiatric:         Mood and Affect: Mood normal.         Behavior: Behavior normal.         Thought Content: Thought content normal.         Judgment: Judgment normal.         ASSESSMENT/PLAN:  1. Acute non-recurrent maxillary sinusitis  Cephalexin and yogurt a lot of fluids    2. Dehydration    - Basic Metabolic Panel; Future    3. Mixed hyperlipidemia    - Lipid Panel; Future      Return in about 3 months (around 9/22/2021) for high cholesterol. Patient Instructions   AntiBiotics and yogurt and drink a lot of fluids. 64 ounces water or propel from 8 AM to 6 PM.    Extensive counseling done regarding DIET AND EXERCISE to lose weight to avoid morbidities associated with overweight. Try to do SCHEDULED 3 - 4 miles brisk walk or elliptical machine use at least 4 days a week and  Dumbbell 2-5 lb arm exercises--4 sets of 4 group arm muscles -- 4 days a week. Breakfast : 4 egg white omelet or scrambled eggs with bell pepper,onion,tomato,spinach etc or boiled eggs for breakfast.     Lunch : Deck of card size meat (baked, broiled or grilled ) with leafy vegetables - spinach / kale / mustard green / lettuce etc. for salad. Supper : Dorann Backer grilled meats -deck of cards sized with 3/4th dinner plate full of vegetables -green bean or broccoli or cauliflower or carrots. If needed , buy bread 35 to 40 kcal- two slices at a time only and Tortillas 50- 90 kcal only at one meal.    Least or no bread, potato, pasta, highly processed foods, fried foods, sweets etc.  Discussed use, benefit, and side effects of prescribed medications. Barriers to compliance discussed. All patient questions answered. Pt voiced understanding.    IF

## 2021-07-07 DIAGNOSIS — E55.9 VITAMIN D DEFICIENCY: ICD-10-CM

## 2021-07-07 DIAGNOSIS — J30.1 SEASONAL ALLERGIC RHINITIS DUE TO POLLEN: ICD-10-CM

## 2021-07-07 RX ORDER — CHOLECALCIFEROL (VITAMIN D3) 125 MCG
1 CAPSULE ORAL DAILY
Qty: 30 TABLET | Refills: 2 | Status: SHIPPED | OUTPATIENT
Start: 2021-07-07 | End: 2021-09-29 | Stop reason: SDUPTHER

## 2021-07-07 RX ORDER — MONTELUKAST SODIUM 10 MG/1
10 TABLET ORAL NIGHTLY
Qty: 30 TABLET | Refills: 2 | Status: SHIPPED | OUTPATIENT
Start: 2021-07-07 | End: 2021-09-29

## 2021-07-08 PROBLEM — E86.0 DEHYDRATION: Status: RESOLVED | Noted: 2021-06-08 | Resolved: 2021-07-08

## 2021-09-29 ENCOUNTER — OFFICE VISIT (OUTPATIENT)
Dept: PRIMARY CARE CLINIC | Age: 46
End: 2021-09-29
Payer: COMMERCIAL

## 2021-09-29 VITALS
HEART RATE: 79 BPM | DIASTOLIC BLOOD PRESSURE: 80 MMHG | HEIGHT: 74 IN | SYSTOLIC BLOOD PRESSURE: 112 MMHG | BODY MASS INDEX: 29.13 KG/M2 | WEIGHT: 227 LBS | OXYGEN SATURATION: 97 %

## 2021-09-29 DIAGNOSIS — Z23 NEED FOR TDAP VACCINATION: ICD-10-CM

## 2021-09-29 DIAGNOSIS — D17.21 LIPOMA OF RIGHT UPPER EXTREMITY: ICD-10-CM

## 2021-09-29 DIAGNOSIS — R10.12 LEFT UPPER QUADRANT PAIN: ICD-10-CM

## 2021-09-29 DIAGNOSIS — E55.9 VITAMIN D DEFICIENCY: Primary | ICD-10-CM

## 2021-09-29 DIAGNOSIS — J30.1 SEASONAL ALLERGIC RHINITIS DUE TO POLLEN: ICD-10-CM

## 2021-09-29 DIAGNOSIS — R06.09 DYSPNEA ON EXERTION: ICD-10-CM

## 2021-09-29 DIAGNOSIS — K21.9 GASTROESOPHAGEAL REFLUX DISEASE, UNSPECIFIED WHETHER ESOPHAGITIS PRESENT: ICD-10-CM

## 2021-09-29 DIAGNOSIS — Z23 NEED FOR INFLUENZA VACCINATION: ICD-10-CM

## 2021-09-29 DIAGNOSIS — E78.2 MIXED HYPERLIPIDEMIA: ICD-10-CM

## 2021-09-29 PROCEDURE — 90674 CCIIV4 VAC NO PRSV 0.5 ML IM: CPT | Performed by: NURSE PRACTITIONER

## 2021-09-29 PROCEDURE — 90715 TDAP VACCINE 7 YRS/> IM: CPT | Performed by: NURSE PRACTITIONER

## 2021-09-29 PROCEDURE — 99214 OFFICE O/P EST MOD 30 MIN: CPT | Performed by: NURSE PRACTITIONER

## 2021-09-29 PROCEDURE — 90471 IMMUNIZATION ADMIN: CPT | Performed by: NURSE PRACTITIONER

## 2021-09-29 PROCEDURE — 90472 IMMUNIZATION ADMIN EACH ADD: CPT | Performed by: NURSE PRACTITIONER

## 2021-09-29 RX ORDER — CHOLECALCIFEROL (VITAMIN D3) 125 MCG
1 CAPSULE ORAL DAILY
Qty: 90 TABLET | Refills: 1 | Status: SHIPPED | OUTPATIENT
Start: 2021-09-29 | End: 2022-03-17

## 2021-09-29 RX ORDER — LORATADINE 10 MG/1
10 TABLET ORAL DAILY
Qty: 90 TABLET | Refills: 3 | Status: SHIPPED | OUTPATIENT
Start: 2021-09-29 | End: 2021-10-20 | Stop reason: ALTCHOICE

## 2021-09-29 ASSESSMENT — ENCOUNTER SYMPTOMS
SHORTNESS OF BREATH: 1
SINUS PAIN: 0
ABDOMINAL DISTENTION: 0
CHEST TIGHTNESS: 0
SINUS PRESSURE: 0
CONSTIPATION: 0
RHINORRHEA: 0
NAUSEA: 0
APNEA: 0
DIARRHEA: 0
EYE DISCHARGE: 0
BLOOD IN STOOL: 0
COUGH: 0
EYE ITCHING: 0
WHEEZING: 0
VOMITING: 0

## 2021-09-29 NOTE — PROGRESS NOTES
9/29/2021    Chief Complaint   Patient presents with   Blanche Garrido Doctor    3 Month Follow-Up     cholesterol       Pam Schuler is a 40 y.o. male, presents today to establish care and to discuss concerns listed below. He is an established patient of [facility]) primary care, however new to me. HPI   Left upper quadrant pain  Intermittent left upper quadrant pain when full or bloated. He is unsure if pain is provoked by greasy, fatty foods. He socially drinks ocassionally. Will order abdominal ultrasound and labs today. Shortness of breath  He reports couple year history of shortness of breath with exercise. He does not feel like endurance is improving despite swimming for exercise 2-3 times/week and walking 3 miles a couple times a week. Is out of breath after swimming 50 meters in pool (1 length of pool)- has to stop and catch breath. He denies chest pain associated with shortness of breath. He is unsure if out of shape, but feels complaint should have improved. 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 He does not have a history of PE/DVT. Mother has history of DVT at age 70. \"Lump on right shoulder\"  Noticed several years ago and appears more pronounced since losing 10-15 lbs recently. \"Feels like lumpy tissue\". He denies history of lipomas. Skin cancer screening  He reports being outdoors frequently and does not wear sunscreen. He is established with dermatologist, Dr. Fernando Wright [facility])-- He will schedule an appointment. Mixed hyperlipidemia  Last lipid panel was 6/3/2021. Statin drug not indicated with last lipid panel- ASCVD Risk: 3.2% (low). Lipid panel and Basic Metabolic Panel written to be done prior to appointment today are pending-- reminded patient to have done in near future. He has been trying to follow a low-cholesterol diet. He has been exercising regularly.     Cholesterol, Total 233 High   0 - 199 mg/dL 06/03/2021  7:12 AM Triglycerides 265 High   0 - 150 mg/dL 06/03/2021  7:12 AM   HDL 38 Low   40 - 60 mg/dL 06/03/2021  7:12 AM   LDL Calculated 142 High   <100 mg/dL 06/03/2021  7:12 AM     History of vitamin D insufficiency  No recent labs on file. He is not taking Vitamin D 2000 UT daily- taking every 3rd day-- he is outdoors frequently. Vit D, 25-Hydroxy 22.8 Low   >=30 ng/mL 05/15/2019 10:38 AM     GERD (with nausea)   Improved since he improved his diet. No longer taking Prilosec 20 mg once daily. Seasonal rhinitis  Loratadine 10 daily. Symptoms have greatly improved with taking Loratadine daily as prescribed. Epistaxis has improved since recent nasal surgery. Review of Systems   Constitutional: Negative for activity change, appetite change, diaphoresis, fatigue and unexpected weight change. HENT: Negative for congestion, postnasal drip, rhinorrhea, sinus pressure, sinus pain and sneezing. Eyes: Negative for discharge and itching. Respiratory: Positive for shortness of breath. Negative for apnea, cough, chest tightness and wheezing. Cardiovascular: Negative for chest pain, palpitations and leg swelling. Gastrointestinal: Negative for abdominal distention, blood in stool, constipation, diarrhea, nausea and vomiting. Musculoskeletal: Negative for arthralgias and myalgias. Skin:        Fatty tissue to left upper extremity   Allergic/Immunologic: Positive for environmental allergies. Neurological: Negative for dizziness, weakness, light-headedness and headaches. Psychiatric/Behavioral: Negative.         Current Outpatient Medications on File Prior to Visit   Medication Sig Dispense Refill    Cholecalciferol (VITAMIN D3) 50 MCG (2000 UT) TABS Take 1 tablet by mouth daily 30 tablet 2    sodium chloride (ALTAMIST SPRAY) 0.65 % nasal spray 1 spray by Nasal route 3 times daily 1 Bottle 3    loratadine (CLARITIN) 10 MG tablet Take 1 tablet by mouth daily 90 tablet 1     No current facility-administered medications on file prior to visit. Allergies   Allergen Reactions    Other Itching     Coloring on pill specifically pink and yellow      Past Medical History:   Diagnosis Date    Acute non-recurrent sinusitis 6/22/2021    Change in skin mole 11/23/2020    DNS (deviated nasal septum) 3/9/2020    Epistaxis 6/2/2021    Gastroesophageal reflux disease with esophagitis without hemorrhage 6/2/2021    GERD (gastroesophageal reflux disease)     Headache     Nasal congestion     Nosebleed     Seasonal allergic rhinitis due to pollen 6/8/2021    Substance abuse ([facility] [address]     Tinnitus      Past Surgical History:   Procedure Laterality Date    SEPTOPLASTY N/A 3/17/2020    PARTIAL RECONSTRUCTION OF LEFT INFERIOR TURBINATE, SEPTAL RECONSTRUCTION, LYSIS INTRANASAL SYNECHIA [facility]     Tobacco Use    Smoking status: Never Smoker    Smokeless tobacco: Never Used   Substance Use Topics    Alcohol use: Yes     Alcohol/week: 2.0 standard drinks     Types: 2 Glasses of wine per week     Comment: socially      Family History   Problem Relation Age of Onset    Cancer Mother 67        breast    Heart Disease Father 58    Other Father 43        schizophrenia-assisted care    Other Brother     Cancer Paternal Grandmother         breast    Heart Disease Paternal Grandfather     High Blood Pressure Paternal Grandfather     High Cholesterol Paternal Grandfather     Heart Attack Paternal Grandfather         Vitals:    09/29/21 1145   BP: 112/80   Site: Left Upper Arm   Position: Sitting   Cuff Size: Medium Adult   Pulse: 79   SpO2: 97%   Weight: 227 lb (103 kg)   Height: 6' 2\" (1.88 m)     Estimated body mass index is 29.15 kg/m² as calculated from the following:    Height as of this encounter: 6' 2\" (1.88 m). Weight as of this encounter: 227 lb (103 kg). Physical Exam  Vitals and nursing note reviewed.    Constitutional:       General: He is not in acute distress. Appearance: Normal appearance. He is overweight. Neck:      Vascular: No carotid bruit. Cardiovascular:      Rate and Rhythm: Normal rate and regular rhythm. Pulses: Normal pulses. Heart sounds: Normal heart sounds, S1 normal and S2 normal. No murmur heard. Pulmonary:      Effort: Pulmonary effort is normal.      Breath sounds: Normal breath sounds. Abdominal:      General: Abdomen is flat. There is no distension. Palpations: Abdomen is soft. There is no mass. Tenderness: There is no abdominal tenderness. There is no guarding. Musculoskeletal:         General: Normal range of motion. Cervical back: Normal range of motion and neck supple. Right lower leg: No edema. Left lower leg: No edema. Lymphadenopathy:      Cervical: No cervical adenopathy. Skin:     General: Skin is warm and dry. Comments: Non-tender, encapsulated fatty tumor to right upper extremity   Neurological:      General: No focal deficit present. Mental Status: He is alert and oriented to person, place, and time. Psychiatric:         Mood and Affect: Mood normal.         Behavior: Behavior normal.         Fatty tumor to right upper extremity      Fatty tumor to right upper extremity     ASSESSMENT/PLAN:  1. Vitamin D deficiency  - Vitamin D 25 Hydroxy; Future  -Continue cholecalciferol (VITAMIN D3) 50 MCG (2000 UT) TABS; Take 1 tablet by mouth daily  Dispense: 90 tablet; Refill: 1    2. Mixed hyperlipidemia  -Stable  - Basic Metabolic Panel; Future  - LIPID PANEL; Future  -Continue to follow low-fat diet  -Continue to work towards weight loss  -Continue regular exercise    3. Gastroesophageal reflux disease, unspecified whether esophagitis present  -Resolved    4. Seasonal allergic rhinitis due to pollen  -Stable  -Continue loratadine (CLARITIN) 10 MG tablet; Take 1 tablet by mouth daily  Dispense: 90 tablet; Refill: 3    5.  Dyspnea on exertion  - New  - XR CHEST (2 VW); Future  - D-DIMER, QUANTITATIVE; Future    6. Left upper quadrant pain  - New  - US ABDOMEN COMPLETE; Future  - Amylase  - Lipase  - CBC Auto Differential; Future    7. Lipoma of right upper extremity  - New  - Ambulatory referral to General Surgery    8. Need for influenza vaccination  - INFLUENZA, MDCK QUADV, 2 YRS AND OLDER, IM, PF, PREFILL SYR OR SDV, 0.5ML (FLUCELVAX QUADV, PF) --Administered    9. Need for Tdap vaccination  - Tdap (age 6y and older) IM (BOOSTRIX)--Administered      Return in about 3 weeks (around 10/20/2021) for to review results of labs, abdominal ultrasound and chest XR. Discussed use, benefit, and side effects of prescribed medications. Patient's questions answered and concerns addressed. Patient agrees to plan of care. My scheduled days in the office reviewed with patient, and same day appointments available. Encouraged to use [employer] for communication as needed. Electronically signed by ARELIS Anderson CNP on 9/29/2021 at 1:00 PM       This dictation was generated by voice recognition computer software. Although all attempts are made to edit the dictation for accuracy, there may be errors in the transcription that are not intended.

## 2021-09-29 NOTE — PATIENT INSTRUCTIONS
Patient Education        Influenza (Flu) Vaccine (Inactivated or Recombinant): What You Need to Know  Why get vaccinated? Influenza vaccine can prevent influenza (flu). Flu is a contagious disease that spreads around the [place] every year, usually between October and May. Anyone can get the flu, but it is more dangerous for some people. Infants and young children, people 72years of age and older, pregnant women, and people with certain health conditions or a weakened immune system are at greatest risk of flu complications. Pneumonia, bronchitis, sinus infections and ear infections are examples of flu-related complications. If you have a medical condition, such as heart disease, cancer or diabetes, flu can make it worse. Flu can cause fever and chills, sore throat, muscle aches, fatigue, cough, headache, and runny or stuffy nose. Some people may have vomiting and diarrhea, though this is more common in children than adults. Each year, thousands of people in the [facility] die from flu, and many more are hospitalized. Flu vaccine prevents millions of illnesses and flu-related visits to the doctor each year. Influenza vaccine  CDC recommends everyone 10months of age and older get vaccinated every flu season. Children 6 months through 6years of age may need 2 doses during a single flu season. Everyone else needs only 1 dose each flu season. It takes about 2 weeks for protection to develop after vaccination. There are many flu viruses, and they are always changing. Each year a new flu vaccine is made to protect against three or four viruses that are likely to cause disease in the upcoming flu season. Even when the vaccine doesn't exactly match these viruses, it may still provide some protection. Influenza vaccine does not cause flu. Influenza vaccine may be given at the same time as other vaccines.   Talk with your health care provider  Tell your vaccine provider if the person getting the vaccine:  · Has had an allergic reaction after a previous dose of influenza vaccine, or has any severe, life-threatening allergies. · Has ever had Guillain-Barré Syndrome (also called GBS). In some cases, your health care provider may decide to postpone influenza vaccination to a future visit. People with minor illnesses, such as a cold, may be vaccinated. People who are moderately or severely ill should usually wait until they recover before getting influenza vaccine. Your health care provider can give you more information. Risks of a vaccine reaction  · Soreness, redness, and swelling where shot is given, fever, muscle aches, and headache can happen after influenza vaccine. · There may be a very small increased risk of Guillain-Barré Syndrome (GBS) after inactivated influenza vaccine (the flu shot). [employer] children who get the flu shot along with pneumococcal vaccine (PCV13), and/or DTaP vaccine at the same time might be slightly more likely to have a seizure caused by fever. Tell your health care provider if a child who is getting flu vaccine has ever had a seizure. People sometimes faint after medical procedures, including vaccination. Tell your provider if you feel dizzy or have vision changes or ringing in the ears. As with any medicine, there is a very remote chance of a vaccine causing a severe allergic reaction, other serious injury, or death. What if there is a serious problem? An allergic reaction could occur after the vaccinated person leaves the clinic. If you see signs of a severe allergic reaction (hives, swelling of the face and throat, difficulty breathing, a fast heartbeat, dizziness, or weakness), call 9-1-1 and get the person to the nearest hospital.  For other signs that concern you, call your health care provider. Adverse reactions should be reported to the Vaccine Adverse Event Reporting System (VAERS).  Your health care provider will usually file this report, or you can do it yourself. Visit the VAERS website at www.vaers. hhs.gov or call 6-949.453.4080. VAERS is only for reporting reactions, and VAERS staff do not give medical advice. The National Vaccine Injury Compensation Program  The National Vaccine Injury Compensation Program (VICP) is a federal program that was created to compensate people who may have been injured by certain vaccines. Visit the VICP website at www.hrsa.gov/vaccinecompensation or call 8-711.962.1009 to learn about the program and about filing a claim. There is a time limit to file a claim for compensation. How can I learn more? · Ask your healthcare provider. · Call your local or state health department. · Contact the Centers for Disease Control and Prevention (CDC):  ? Call 1-989.911.3637 (1-132-NNT-INFO) or  ? Visit CDC's website at www.cdc.gov/flu  Vaccine Information Statement (Interim)  Inactivated Influenza Vaccine  8/15/2019  [address] [id number]  Department of Health and Human Services  Centers for Disease Control and Prevention  Many Vaccine Information Statements are available in [place] and other languages. See www.immunize.org/vis. Muchas hojas de información sobre vacunas están disponibles en español y en otros idiomas. Visite www.immunize.org/vis. Care instructions adapted under license by [facility] ([facility]). If you have questions about a medical condition or this instruction, always ask your healthcare professional. [place] [address] any warranty or liability for your use of this information. Patient Education        Learning About High-Vitamin D Foods  What foods are high in vitamin D? The foods you eat contain nutrients, such as vitamins and minerals. Vitamin D is a nutrient. Your body needs the right amount to stay healthy and work as it should. You can use the list below to help you make choices about which foods to eat. Here are some foods that contain vitamin D.   Fruits  · Orange juice, fortified with vitamin D  Grains  · Cereals, fortified with vitamin D  Dairy and dairy alternatives  · Milk, fortified with vitamin D  · Non-dairy milk (almond, rice, soy), fortified with vitamin D  · Yogurt, fortified with vitamin D  Protein foods  · Flounder  · Mackerel  · Sardines  · [place]  · Sole  · Trout  · Tuna  Fats  · Cod liver oil  Work with your doctor to find out how much of this nutrient you need. Depending on your health, you may need more or less of it in your diet. Where can you learn more? Go to https://chpepiceweb.[email or web address] org and sign in to your I[employer] account. Enter [id number] in the [facility] box to learn more about \"Learning About High-Vitamin D Foods. \"     If you do not have an account, please click on the \"Sign Up Now\" link. Current as of: December 17, 2020               Content Version: 13.0  © 7435-3486 [employer]. Care instructions adapted under license by [facility] ([facility]). If you have questions about a medical condition or this instruction, always ask your healthcare professional. [place] [address] any warranty or liability for your use of this information. Patient Education        High Cholesterol: Care Instructions  Overview     Cholesterol is a type of fat in your blood. It is needed for many body functions, such as making new cells. Cholesterol is made by your body. It also comes from food you eat. High cholesterol means that you have too much of the fat in your blood. This raises your risk of a heart attack and stroke. LDL and HDL are part of your total cholesterol. LDL is the \"bad\" cholesterol. High LDL can raise your risk for coronary artery disease, heart attack, and stroke. HDL is the \"good\" cholesterol. It helps clear bad cholesterol from the body. High HDL is linked with a lower risk of coronary artery disease, heart attack, and stroke. Your cholesterol levels help your doctor find out your risk for having a heart attack or stroke.  You and your doctor can talk about whether you need to lower your risk and what treatment is best for you. Treatment options include a heart-healthy lifestyle and medicine. Both options can help lower your cholesterol and your risk. The way you choose to lower your risk will depend on how high your risk is for heart attack and stroke. It will also depend on how you feel about taking medicines. Follow-up care is a key part of your treatment and safety. Be sure to make and go to all appointments, and call your doctor if you are having problems. It's also a good idea to know your test results and keep a list of the medicines you take. How can you care for yourself at home? · Eat heart-healthy foods. ? Eat fruits, vegetables, whole grains, beans, and other high-fiber foods. ? Eat lean proteins, such as seafood, lean meats, beans, nuts, and soy products. ? Eat healthy fats, such as canola and olive oil. ? Choose foods that are low in saturated fat. ? Limit sodium and alcohol. ? Limit drinks and foods with added sugar. · Be physically active. Try to do moderate activity at least 2½ hours a week. Or try to do vigorous activity at least 1¼ hours a week. You may want to walk or try other activities, such as running, swimming, cycling, or playing tennis or team sports. · Stay at a healthy weight or lose weight by making the changes in eating and physical activity listed above. Losing just a small amount of weight, even 5 to 10 pounds, can help reduce your risk for having a heart attack or stroke. · Do not smoke. · Manage other health problems. These include diabetes and high blood pressure. If you think you may have a problem with alcohol or drug use, talk to your doctor. · If you take medicine, take it exactly as prescribed. Call your doctor if you think you are having a problem with your medicine. · Check with your doctor or pharmacist before you use any other medicines, including over-the-counter medicines.  Make sure your doctor knows all of the medicines, vitamins, herbal products, and supplements you take. Taking some medicines together can cause problems. When should you call for help? Watch closely for changes in your health, and be sure to contact your doctor if:    · You need help making lifestyle changes.     · You have questions about your medicine. Where can you learn more? Go to https://chpepiceweb.[email or web address] and sign in to your [employer] account. Enter O795 in the [employer] box to learn more about \"High Cholesterol: Care Instructions. \"     If you do not have an account, please click on the \"Sign Up Now\" link. Current as of: April 29, 2021               Content Version: 13.0  © 2006-2021 Healthwise, Incorporated. Care instructions adapted under license by [facility] ([facility]). If you have questions about a medical condition or this instruction, always ask your healthcare professional. [address] any warranty or liability for your use of this information. Patient Education        Learning About High Cholesterol  What is high cholesterol? High cholesterol means that you have too much cholesterol in your blood. Cholesterol is a type of fat. It's needed for many body functions, such as making new cells. Cholesterol is made by your body. It also comes from food you eat. Having high cholesterol can lead to the buildup of plaque in artery walls. This can increase your risk of heart attack and stroke. When your doctor talks about high cholesterol levels, your doctor is talking about your total cholesterol and LDL cholesterol (the \"bad\" cholesterol) levels. Your doctor may also speak about HDL (the \"good\" cholesterol) levels. High HDL is linked with a lower risk for coronary artery disease, heart attack, and stroke. Your cholesterol levels help your doctor find out your risk for having a heart attack or stroke. How can you help prevent high cholesterol?   A heart-healthy lifestyle can help you prevent high cholesterol and lower your risk for a heart attack and stroke. · Eat heart-healthy foods. ? Eat fruits, vegetables, whole grains, beans, and other high-fiber foods. ? Eat lean proteins, such as seafood, lean meats, beans, nuts, and soy products. ? Eat healthy fats, such as canola and olive oil. ? Choose foods that are low in saturated fat. ? Limit sodium and alcohol. ? Limit drinks and foods with added sugar. · Be active. Try to do moderate activity at least 2½ hours a week. Or try vigorous activity at least 1¼ hours a week. You may want to walk or try other activities, such as running, swimming, cycling, or playing tennis or team sports. · Stay at a healthy weight. Lose weight if you need to. · Don't smoke. If you need help quitting, talk to your doctor about stop-smoking programs and medicines. These can increase your chances of quitting for good. How is high cholesterol treated? The goal of treatment is to reduce your chances of having a heart attack or stroke. The goal is not to lower your cholesterol numbers only. · Have a heart-healthy lifestyle. This includes eating healthy foods, not smoking, losing weight, and being more active. · You may choose to take medicine. Follow-up care is a key part of your treatment and safety. Be sure to make and go to all appointments, and call your doctor if you are having problems. It's also a good idea to know your test results and keep a list of the medicines you take. Where can you learn more? Go to https://Grono.netprakashMyLifePlace.ID [employer]. org and sign in to your Checkd.In account. Enter E169 in the [facility] box to learn more about \"Learning About High Cholesterol. \"     If you do not have an account, please click on the \"Sign Up Now\" link. Current as of: April 29, 2021               Content Version: 13.0  © 9472-7184 Healthwise, Incorporated.    Care instructions adapted under license by [facility]. If you have questions about a medical condition or this instruction, always ask your healthcare professional. [place] [address] any warranty or liability for your use of this information. Patient Education        Seasonal Allergies: Care Instructions  Your Care Instructions     Allergies occur when your body's defense system (immune system) overreacts to certain substances. The immune system treats a harmless substance as if it were a harmful germ or virus. Many things can cause this to happen. Examples include pollens, medicine, food, dust, animal dander, and mold. Your allergies are seasonal if you have symptoms just at certain times of the year. In that case, you are probably allergic to pollens from certain trees, grasses, or weeds. Allergies can be mild or severe. Over-the-counter allergy medicine may help with some symptoms. Read and follow all instructions on the label. Managing your allergies is an important part of staying healthy. Your doctor may suggest that you have tests to help find the cause of your allergies. When you know what things trigger your symptoms, you can avoid them. This can prevent allergy symptoms and other health problems. In some cases, immunotherapy might help. For this treatment, you get shots or use pills that have a small amount of certain allergens in them. Your body \"gets used to\" the allergen, so you react less to it over time. This kind of treatment may help prevent or reduce some allergy symptoms. Follow-up care is a key part of your treatment and safety. Be sure to make and go to all appointments, and call your doctor if you are having problems. It's also a good idea to know your test results and keep a list of the medicines you take. How can you care for yourself at home? · Be safe with medicines. Take your medicines exactly as prescribed. Call your doctor if you think you are having a problem with your medicine.   · During your allergy season, keep windows closed. If you need to use air-conditioning, change or clean all filters every month. Take a shower and change your clothes after you have been outside. · Stay inside when pollen counts are high. Vacuum once or twice a week. Use a vacuum  with a HEPA filter or a double-thickness filter. When should you call for help? Give an epinephrine shot if:    · You think you are having a severe allergic reaction. After giving an epinephrine shot, call 911, even if you feel better. Call 911 if:    · You have symptoms of a severe allergic reaction. These may include:  ? Sudden raised, red areas (hives) all over your body. ? Swelling of the throat, mouth, lips, or tongue. ? Trouble breathing. ? Passing out (losing consciousness). Or you may feel very lightheaded or suddenly feel weak, confused, or restless.     · You have been given an epinephrine shot, even if you feel better. Call your doctor now or seek immediate medical care if:    · You have symptoms of an allergic reaction, such as:  ? A rash or hives (raised, red areas on the skin). ? Itching. ? Swelling. ? Belly pain, nausea, or vomiting. Watch closely for changes in your health, and be sure to contact your doctor if:    · You do not get better as expected. Where can you learn more? Go to https://ChikkapePOW.Neurosearch. org and sign in to your Jugo account. Enter J912 in the [facility] box to learn more about \"Seasonal Allergies: Care Instructions. \"     If you do not have an account, please click on the \"Sign Up Now\" link. Current as of: February 10, 2021               Content Version: 13.0  © 2059-9054 Healthwise, Incorporated. Care instructions adapted under license by [facility] ([facility]). If you have questions about a medical condition or this instruction, always ask your healthcare professional. [place] [address] any warranty or liability for your use of this information. Patient Education        Lipoma: Care Instructions  Your Care Instructions  A lipoma is a growth of fat just below the skin. It may feel soft and rubbery. Lipomas can occur anywhere on the body. But they are most common on the torso, neck, upper thighs, upper arms, and armpits. A lipoma does not turn into cancer. Lipomas usually are not treated, because most of them don't hurt or cause problems. But your doctor may remove a lipoma if it is painful, gets infected, or bothers you. Follow-up care is a key part of your treatment and safety. Be sure to make and go to all appointments, and call your doctor if you are having problems. It's also a good idea to know your test results and keep a list of the medicines you take. How can you care for yourself at home? · A lipoma usually needs no care at home unless your doctor made a cut (incision) to remove it. · If your doctor told you how to care for your incision, follow your doctor's instructions. If you did not get instructions, follow this general advice:  ? Wash around the incision with clean water 2 times a day. Don't use hydrogen peroxide or alcohol. These can slow healing. ? You may cover the incision with a thin layer of petroleum jelly, such as Vaseline, and a nonstick bandage. ? Apply more petroleum jelly and replace the bandage as needed. When should you call for help? Call your doctor now or seek immediate medical care if:    · You have signs of infection, such as:  ? Increased pain, swelling, warmth, or redness. ? Red streaks leading from the lipoma. ? Pus draining from the lipoma. ? A fever. Watch closely for changes in your health, and be sure to contact your doctor if:    · The lipoma is growing or changing.     · You do not get better as expected. Where can you learn more? Go to https://BetterDoctorpePosterouseb.Utrip. org and sign in to your [employer] account.  Enter R952 in the M[employer]ia box to learn more about \"Lipoma: Care Instructions. \"     If you do not have an account, please click on the \"Sign Up Now\" link. Current as of: March 3, 2021               Content Version: 13.0  © 2006-2021 Healthwise, [employer] [place]. Care instructions adapted under license by [facility] ([facility]). If you have questions about a medical condition or this instruction, always ask your healthcare professional. [address] any warranty or liability for your use of this information. Patient Education        Shortness of Breath: Care Instructions  Your Care Instructions     Shortness of breath has many causes. Sometimes conditions such as anxiety can lead to shortness of breath. Some people get mild shortness of breath when they exercise. Trouble breathing also can be a symptom of a serious problem, such as asthma, lung disease, emphysema, heart problems, and pneumonia. If your shortness of breath continues, you may need tests and treatment. Watch for any changes in your breathing and other symptoms. Follow-up care is a key part of your treatment and safety. Be sure to make and go to all appointments, and call your doctor if you are having problems. It's also a good idea to know your test results and keep a list of the medicines you take. How can you care for yourself at home? · Do not smoke or allow others to smoke around you. If you need help quitting, talk to your doctor about stop-smoking programs and medicines. These can increase your chances of quitting for good. · Get plenty of rest and sleep. · Take your medicines exactly as prescribed. Call your doctor if you think you are having a problem with your medicine. · Find healthy ways to deal with stress. ? Exercise daily. ? Get plenty of sleep. ? Eat regularly and well. When should you call for help? Call 911 anytime you think you may need emergency care. For example, call if:    · You have severe shortness of breath.     · You have symptoms of a heart attack.  These may include:  ? Chest pain or pressure, or a strange feeling in the chest.  ? Sweating. ? Shortness of breath. ? Nausea or vomiting. ? Pain, pressure, or a strange feeling in the back, neck, jaw, or upper belly or in one or both shoulders or arms. ? Lightheadedness or sudden weakness. ? A fast or irregular heartbeat. After you call 911, the  may tell you to chew 1 adult-strength or 2 to 4 low-dose aspirin. Wait for an ambulance. Do not try to drive yourself. Call your doctor now or seek immediate medical care if:    · Your shortness of breath gets worse or you start to wheeze. Wheezing is a high-pitched sound when you breathe.     · You wake up at night out of breath or have to prop your head up on several pillows to breathe.     · You are short of breath after only light activity or while at rest.   Watch closely for changes in your health, and be sure to contact your doctor if:    · You do not get better over the next 1 to 2 days. Where can you learn more? Go to https://GOPOP.TV.Solvate. org and sign in to your Rivet [employer] Sway account. Enter S780 in the [facility] box to learn more about \"Shortness of Breath: Care Instructions. \"     If you do not have an account, please click on the \"Sign Up Now\" link. Current as of: July 6, 2021               Content Version: 13.0  © 8337-3461 Healthwise, [facility]. Care instructions adapted under license by [facility] ([facility]). If you have questions about a medical condition or this instruction, always ask your healthcare professional. [place] [address] any warranty or liability for your use of this information.

## 2021-10-01 ENCOUNTER — OFFICE VISIT (OUTPATIENT)
Dept: SURGERY | Age: 46
End: 2021-10-01
Payer: COMMERCIAL

## 2021-10-01 VITALS — BODY MASS INDEX: 29.15 KG/M2 | WEIGHT: 227 LBS | SYSTOLIC BLOOD PRESSURE: 112 MMHG | DIASTOLIC BLOOD PRESSURE: 80 MMHG

## 2021-10-01 DIAGNOSIS — D17.21 LIPOMA OF RIGHT UPPER EXTREMITY: Primary | ICD-10-CM

## 2021-10-01 PROCEDURE — 99212 OFFICE O/P EST SF 10 MIN: CPT | Performed by: SURGERY

## 2021-10-01 ASSESSMENT — ENCOUNTER SYMPTOMS
EYES NEGATIVE: 1
ALLERGIC/IMMUNOLOGIC NEGATIVE: 1
RESPIRATORY NEGATIVE: 1
GASTROINTESTINAL NEGATIVE: 1

## 2021-10-01 NOTE — PROGRESS NOTES
:     Parker Kaminski is a 40 y.o. male     CC: Soft tissue mass of the right deltoid region    HPI: 50-year-old male who presents for evaluation of a soft tissue mass of the right deltoid region. The mass has been present for a few years. It has become more noticeable with recent weight loss. Past Medical History:   Diagnosis Date    Acute non-recurrent sinusitis 6/22/2021    Change in skin mole 11/23/2020    DNS (deviated nasal septum) 3/9/2020    Epistaxis 6/2/2021    Gastroesophageal reflux disease with esophagitis without hemorrhage 6/2/2021    GERD (gastroesophageal reflux disease)     Headache     Nasal congestion     Nosebleed     Seasonal allergic rhinitis due to pollen 6/8/2021    Substance abuse ([address]     Tinnitus        Other     Past Surgical History:   Procedure Laterality Date    SEPTOPLASTY N/A 3/17/2020    PARTIAL RECONSTRUCTION OF LEFT INFERIOR TURBINATE, SEPTAL RECONSTRUCTION, LYSIS INTRANASAL SYNECHIA performed by Nola Farah MD at [facility] [address]        Prior to Visit Medications    Medication Sig Taking? 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[employer]   Tobacco Use    Smoking status: Never Smoker    Smokeless tobacco: Never Used   Vaping Use    Vaping Use: Never used   Substance and Sexual Activity    Alcohol use:  Yes     Alcohol/week: 2.0 standard drinks     Types: 2 Glasses of wine per week     Comment: socially    Drug use: Not Currently     Types: Cocaine     Comment: in late 25s     Sexual activity: Yes     Partners: Female   Other Topics Concern    Not on file   Social History Narrative    Not on file     Social Determinants of Health     Financial Resource Strain:     Difficulty of Paying Living Expenses:    Food Insecurity:     Worried About Running Out of Food in the Last Year:     [address] in the Last Year:    Transportation Needs:     Lack of Transportation (Medical):  Lack of Transportation (Non-Medical):    Physical Activity: Sufficiently Active    Days of Exercise per Week: 4 days    Minutes of Exercise per Session: 40 min   Stress:     Feeling of Stress :    Social Connections:     Frequency of Communication with Friends and Family:     Frequency of Social Gatherings with Friends and Family:     Attends [employer] Services:     Active Member of Clubs or Organizations:     Attends Club or Organization Meetings:     Marital Status:    Intimate Partner Violence:     Fear of Current or Ex-Partner:     Emotionally Abused:     Physically Abused:     Sexually Abused:        Review of Systems   Constitutional: Negative. HENT: Negative. Eyes: Negative. Respiratory: Negative. Cardiovascular: Negative. Gastrointestinal: Negative. Endocrine: Negative. Genitourinary: Negative. Musculoskeletal: Negative. Skin: Negative. Allergic/Immunologic: Negative. Neurological: Negative. Hematological: Negative. Psychiatric/Behavioral: Negative.        :   Physical Exam  Constitutional:       Appearance: He is well-developed. HENT:      Head: Normocephalic and atraumatic. Right Ear: External ear normal.      Left Ear: External ear normal.   Eyes:      Conjunctiva/sclera: Conjunctivae normal.   Cardiovascular:      Rate and Rhythm: Regular rhythm. Musculoskeletal:         General: Normal range of motion. Cervical back: Normal range of motion and neck supple. Skin:     General: Skin is warm and dry. Neurological:      Mental Status: He is alert and oriented to person, place, and time. Psychiatric:         Behavior: Behavior normal.     Approximately 4 x 3 cm soft tissue mass of the right deltoid region consistent with a lipoma  /80   Wt 227 lb (103 kg)   BMI 29.15 kg/m²     :      Pleasant 67-year-old male who presents for evaluation of a soft tissue mass of the right deltoid region which has been present for a few years. The mass causes symptoms of localized discomfort. Physical examination reveals an approximately 4 x 3 cm soft tissue mass consistent with a lipoma. Plan:      Excision of lipoma of the right deltoid region under local anesthetic in the office.

## 2021-10-01 NOTE — LETTER
[address] [address]  Phone: [phone]  Fax: [phone]    October 1, 2021    Patient: Wanda Gray  MRN:  [id number]  YOB: 1975  Date of Visit: 10/1/2021    Dear Angelo Garcia,    Thank you for the request for consultation for [facility]. Below are the relevant portions of my assessment and plan of care. Assessment:  Pleasant 67-year-old male who presents for evaluation of a soft tissue mass of the right deltoid region which has been present for a few years. The mass causes symptoms of localized discomfort. Physical examination reveals an approximately 4 x 3 cm soft tissue mass consistent with a lipoma. Plan:  Excision of lipoma of the right deltoid region under local anesthetic in the office. If you have questions, please do not hesitate to call me. I look forward to following Bryan along with you.     Sincerely,    Gallo Rogers MD    CC providers:    Tilmon Boast, APRN - CNP  [id number]  [address] [facility] [address]

## 2021-10-11 ENCOUNTER — HOSPITAL ENCOUNTER (OUTPATIENT)
Dept: GENERAL RADIOLOGY | Age: 46
Discharge: HOME | End: 2021-10-11
Payer: COMMERCIAL

## 2021-10-11 ENCOUNTER — HOSPITAL ENCOUNTER (OUTPATIENT)
Dept: ULTRASOUND IMAGING | Age: 46
Discharge: HOME | End: 2021-10-11
Payer: COMMERCIAL

## 2021-10-11 ENCOUNTER — HOSPITAL ENCOUNTER (OUTPATIENT)
Age: 46
Discharge: HOME | End: 2021-10-11
Payer: COMMERCIAL

## 2021-10-11 DIAGNOSIS — R06.09 DYSPNEA ON EXERTION: ICD-10-CM

## 2021-10-11 DIAGNOSIS — R10.12 LEFT UPPER QUADRANT PAIN: ICD-10-CM

## 2021-10-11 PROCEDURE — 71046 X-RAY EXAM CHEST 2 VIEWS: CPT

## 2021-10-11 PROCEDURE — 76700 US EXAM ABDOM COMPLETE: CPT

## 2021-10-14 ENCOUNTER — PROCEDURE VISIT (OUTPATIENT)
Dept: SURGERY | Age: 46
End: 2021-10-14
Payer: COMMERCIAL

## 2021-10-14 VITALS — WEIGHT: 227 LBS | DIASTOLIC BLOOD PRESSURE: 80 MMHG | SYSTOLIC BLOOD PRESSURE: 112 MMHG | BODY MASS INDEX: 29.15 KG/M2

## 2021-10-14 DIAGNOSIS — D17.21 LIPOMA OF RIGHT UPPER EXTREMITY: Primary | ICD-10-CM

## 2021-10-14 PROCEDURE — 24071 EXC ARM/ELBOW LES SC 3 CM/>: CPT | Performed by: SURGERY

## 2021-10-14 NOTE — PROGRESS NOTES
Office Procedure    Pre op Dx-lipoma right deltoid region    Post op Dx-same    Procedure-Excision of 4 cm lipoma right deltoid region     Surgeon-Dr. Elsa Lorenzo    Anesthesia-local    EBL-min    Operative indications and consent-40year-old male with a tender lipoma of the right deltoid region. He is brought in today for excision of a lipoma. Patient explained risks,benefits, complications and alternatives. Procedure-Patient prepped and draped in the usual sterile fashion. Supine position with his right hand across his chest. The skin and subcutaneous tissues were infiltrated with 1% lidocaine. A 4 cm lipoma of the right deltoid region was excised. The lipoma extended down to the underlying fascia. The subcutaneous layer and superficial fascia was closed with 3-0 vicryl followed by 4-0 vicryl in the skin. Dermabond was applied. The patient tolerated the procedure well.

## 2021-10-20 ENCOUNTER — VIRTUAL VISIT (OUTPATIENT)
Dept: PRIMARY CARE CLINIC | Age: 46
End: 2021-10-20
Payer: COMMERCIAL

## 2021-10-20 DIAGNOSIS — J30.2 CHRONIC SEASONAL ALLERGIC RHINITIS: ICD-10-CM

## 2021-10-20 DIAGNOSIS — E78.00 HYPERCHOLESTEROLEMIA: Primary | ICD-10-CM

## 2021-10-20 DIAGNOSIS — R06.09 DYSPNEA ON EXERTION: ICD-10-CM

## 2021-10-20 PROCEDURE — 99214 OFFICE O/P EST MOD 30 MIN: CPT | Performed by: NURSE PRACTITIONER

## 2021-10-20 RX ORDER — LEVOCETIRIZINE DIHYDROCHLORIDE 5 MG/1
5 TABLET, FILM COATED ORAL NIGHTLY
Qty: 90 TABLET | Refills: 1 | Status: SHIPPED | OUTPATIENT
Start: 2021-10-20 | End: 2022-01-18

## 2021-10-20 RX ORDER — FLUTICASONE PROPIONATE 50 MCG
2 SPRAY, SUSPENSION (ML) NASAL DAILY
Qty: 16 G | Refills: 5 | Status: SHIPPED | OUTPATIENT
Start: 2021-10-20 | End: 2022-03-17

## 2021-10-20 SDOH — ECONOMIC STABILITY: FOOD INSECURITY: WITHIN THE PAST 12 MONTHS, THE FOOD YOU BOUGHT JUST DIDN'T LAST AND YOU DIDN'T HAVE MONEY TO GET MORE.: NEVER TRUE

## 2021-10-20 SDOH — ECONOMIC STABILITY: FOOD INSECURITY: WITHIN THE PAST 12 MONTHS, YOU WORRIED THAT YOUR FOOD WOULD RUN OUT BEFORE YOU GOT MONEY TO BUY MORE.: NEVER TRUE

## 2021-10-20 ASSESSMENT — ENCOUNTER SYMPTOMS
WHEEZING: 0
SORE THROAT: 0
CHOKING: 0
CHEST TIGHTNESS: 0
EYE ITCHING: 0
SINUS PRESSURE: 1
COUGH: 0
RHINORRHEA: 1
APNEA: 0
VOICE CHANGE: 0
SHORTNESS OF BREATH: 1
SINUS PAIN: 0

## 2021-10-20 ASSESSMENT — SOCIAL DETERMINANTS OF HEALTH (SDOH): HOW HARD IS IT FOR YOU TO PAY FOR THE VERY BASICS LIKE FOOD, HOUSING, MEDICAL CARE, AND HEATING?: NOT HARD AT ALL

## 2021-10-20 NOTE — PROGRESS NOTES
10/20/2021    TELEHEALTH EVALUATION -- Audio/Visual (During FAFSJ-82 public health emergency)    Chief Complaint   Patient presents with    Results     blood work and imaging results    Other     has some questions about sinus and allergies issues        HPI:    Pasha Bassett (: 1975) has requested an audio/video evaluation for the following concern(s): Review of lab results and concerns of sinus and allergy symptoms. Review of lab results discussed at length with patient and all questions and concerns were addressed. Hypercholesteremia  Cholesterol, Total 243High   0 - 199 mg/dL 10/01/2021  8:22 AM   Triglycerides 131  0 - 150 mg/dL 10/01/2021  8:22 AM   HDL 37Low   40 - 60 mg/dL 10/01/2021  8:22 AM   LDL Calculated 180High   <100 mg/dL 10/01/2021  8:22 AM     ASCVD Risk: 3.2 (low)-- no statin initiated. Lifestyle modifications to lower cholesterol. Discussed the following:  - Weight loss, aerobic exercise, and start diet lower in saturated fats. - Increase fresh fruits/vegetables, (baked) fish, chicken, turkey. - Limit red meat to once a month. - Limit fasting food to 1-2 times a month. - Decrease carbohydrates and refined sugar.   - Start aerobic exercise (brisk walking) for 30 minutes or greater, 4-5 times per week. Dyspnea on exertion  Continues to have dyspnea with swimming labs requiring him to rest between laps. D-Dimer and chest XR normal-- will refer to pulmonology for further evaluation. D-Dimer, Quant <200  0 - 229 ng/mL DDU 10/01/2021  8:22 AM       EXAMINATION:   TWO XRAY VIEWS OF THE CHEST       10/11/2021 6:35 pm       COMPARISON:   None.       HISTORY:   ORDERING SYSTEM PROVIDED HISTORY: Dyspnea on exertion   TECHNOLOGIST PROVIDED HISTORY:   Reason for exam:->shortness of breath with exercise   Reason for Exam: shortness of breath with exercise, Dyspnea on exertion   Acuity: Unknown   Type of Exam: Unknown       FINDINGS:   The lungs are without acute focal process.  There is no effusion or   pneumothorax. The cardiomediastinal silhouette is without acute process. The   osseous structures are without acute process.           Impression   No acute process. Review of lab results.      Vit D, 25-Hydroxy 59.9  >=30 ng/mL 10/01/2021  8:22 AM     WBC  7.2  4.0 - 11.0 K/uL Final 10/01/2021  8:22 AM [facility]   RBC 5.38  4.20 - 5.90 M/uL Final 10/01/2021  8:22 AM [address] [facility]   Hemoglobin 16.6  13.5 - 17.5 g/dL Final 10/01/2021  8:22 AM [facility]   Hematocrit 49.9  40.5 - 52.5 % Final 10/01/2021  8:22 AM [facility]   MCV 92.6  80.0 - 100.0 fL Final 10/01/2021  8:22 AM [facility]   MCH 30.8  26.0 - 34.0 pg Final 10/01/2021  8:22 AM [facility]   MCHC 33.2  31.0 - 36.0 g/dL Final 10/01/2021  8:22 AM [address] [facility]   RDW 13.7  12.4 - 15.4 % Final 10/01/2021  8:22 AM [facility]   Platelets 290  870 - 450 K/uL Final 10/01/2021  8:22 AM [address] [facility]   MPV 9.1  5.0 - 10.5 fL Final 10/01/2021  8:22 AM [address] [facility]   Neutrophils % 56.4  % Final 10/01/2021  8:22 AM [facility]   Lymphocytes % 32.1  % Final 10/01/2021  8:22 AM [address] [facility]   Monocytes % 8.3  % Final 10/01/2021  8:22 AM [address] [facility]   Eosinophils % 2.5  % Final 10/01/2021  8:22 AM [address] [facility]   Basophils % 0.7  % Final 10/01/2021  8:22 AM [address] [facility]   Neutrophils Absolute 4.1  1.7 - 7.7 K/uL Final 10/01/2021  8:22 AM [facility]   Lymphocytes Absolute 2.3  1.0 - 5.1 K/uL Final 10/01/2021  8:22 AM [facility]   Monocytes Absolute 0.6  0.0 - 1.3 K/uL Final 10/01/2021  8:22 AM [facility]   Eosinophils Absolute 0.2  0.0 - 0.6 K/uL Final 10/01/2021  8:22 AM [facility]   Basophils Absolute 0.0  0.0 - 0.2 K/uL Final 10/01/2021  8:22 AM [facility]     Sodium 140  136 - 145 mmol/L Final 10/01/2021  8:22 AM [facility]   Potassium 4.2  3.5 - 5.1 mmol/L Final 10/01/2021  8:22 AM [facility]   Chloride 103  99 - 110 mmol/L Final 10/01/2021  8:22 AM [facility]   CO2 21  21 - 32 mmol/L Final 10/01/2021  8:22 AM [facility]   Anion Gap 16  3 - 16 Final 10/01/2021  8:22 AM [facility]   Glucose 81  70 - 99 mg/dL Final 10/01/2021  8:22 AM [address] [facility]   BUN 19  7 - 20 mg/dL Final 10/01/2021  8:22 AM [facility]   CREATININE 1.2  0.9 - 1.3 mg/dL Final 10/01/2021  8:22 AM [facility]   GFR Non-African American >60  >60 Final 10/01/2021  8:22 AM [facility]   >60 mL/min/1.73m2 EGFR, calc. for ages 25 and older using the   MDRD formula (not corrected for weight), is valid for stable   renal function. GFR  >60  >60 Final 10/01/2021  8:22 AM [address] [facility]   Chronic Kidney Disease: less than 60 ml/min/1.73 sq. m.         Kidney Failure: less than 15 ml/min/1.73 sq.m. Results valid for patients 18 years and older. Calcium 9.2  8.3 - 10.6 mg/dL Final 10/01/2021  8:22 AM [facility]       Allergic rhinitis  Reports long history allergies with contact dermatitis and examined at urgent care and prescribed Medrol Dosepak and \"felt normal for the first time in 2 years\". Wonders with oral steroid and antibiotic would help and believes he would feel better-discussed with patient his symptoms are consistent with uncontrolled allergies. He is taking loratidine 10 mg once daily--we will discontinue loratidine and start Xyzal once daily and add Flonase 2 sprays per nostril once daily. Will refer to allergist if no improvement. Review of Systems   Constitutional: Negative for activity change, appetite change, chills, fatigue, fever and unexpected weight change. HENT: Positive for congestion, rhinorrhea and sinus pressure. Negative for nosebleeds, postnasal drip, sinus pain, sore throat and voice change. Eyes: Negative for itching. Respiratory: Positive for shortness of breath. Negative for apnea, cough, choking, chest tightness and wheezing. Cardiovascular: Negative for chest pain, palpitations and leg swelling. Neurological: Negative for dizziness, weakness and headaches. Current Outpatient Medications on File Prior to Visit   Medication Sig Dispense Refill    Cholecalciferol (VITAMIN D3) 50 MCG (2000 UT) TABS Take 1 tablet by mouth daily 90 tablet 1     No current facility-administered medications on file prior to visit.         Past Medical History:   Diagnosis Date    Acute non-recurrent sinusitis 6/22/2021    Change in skin mole 11/23/2020    DNS (deviated nasal septum) 3/9/2020    Epistaxis 6/2/2021    Gastroesophageal reflux disease with esophagitis without hemorrhage 6/2/2021    GERD (gastroesophageal reflux disease)     Headache     Nasal congestion     Nosebleed     Seasonal allergic rhinitis due to pollen 6/8/2021    Substance abuse ([facility] [address]     Tinnitus        Past Surgical History:   Procedure Laterality Date    SEPTOPLASTY N/A 3/17/2020    PARTIAL RECONSTRUCTION OF LEFT INFERIOR TURBINATE, SEPTAL RECONSTRUCTION, LYSIS INTRANASAL SYNECHIA performed by Jorje Barton MD at [facility] OR       Family History   Problem Relation Age of Onset    Cancer Mother 67        breast    Heart Disease Father 58    Other Father 43        schizophrenia-assisted care    Other Brother     Cancer Paternal Grandmother         breast    Heart Disease Paternal Grandfather     High Blood Pressure Paternal Grandfather     High Cholesterol Paternal Grandfather     Heart Attack Paternal Grandfather        Allergies   Allergen Reactions    Singulair [Montelukast] Rash    Other Itching     Coloring on pill specifically pink and yellow        Social History     Tobacco Use    Smoking status: Never Smoker    Smokeless tobacco: Never Used   Vaping Use    Vaping Use: Never used   Substance Use Nostril route daily 16 g 5    levocetirizine (XYZAL) 5 MG tablet Take 1 tablet by mouth nightly 90 tablet 1    Cholecalciferol (VITAMIN D3) 50 MCG (2000 UT) TABS Take 1 tablet by mouth daily 90 tablet 1     No current facility-administered medications for this visit. Current Outpatient Medications   Medication Sig Dispense Refill    loratadine (CLARITIN) 10 MG tablet Take 1 tablet by mouth daily 90 tablet 3    Cholecalciferol (VITAMIN D3) 50 MCG (2000 UT) TABS Take 1 tablet by mouth daily 90 tablet 1     No current facility-administered medications for this visit. Jose Raul Velasco is a 40 y.o. male being evaluated by a Virtual Visit (video visit) encounter to address concerns as mentioned above. A caregiver was present when appropriate. Due to this being a TeleHealth encounter (During [facility] public health emergency), evaluation of the following organ systems was limited: Vitals/Constitutional/EENT/Resp/CV/GI//MS/Neuro/Skin/Heme-Lymph-Imm. Pursuant to the emergency declaration under the [address] [place] [address], [address] [place] [address] authority and the [employer] Act, this Virtual Visit was conducted with patient's (and/or legal guardian's) consent, to reduce the patient's risk of exposure to COVID-19 and provide necessary medical care. The patient (and/or legal guardian) has also been advised to contact this office for worsening conditions or problems, and seek emergency medical treatment and/or call 911 if deemed necessary. Patient identification was verified at the start of the visit: Yes    Total time spent on this encounter: 25 minutes. Services were provided through a video synchronous discussion virtually to substitute for in-person clinic visit. Patient was located in their home. Provider was located in the office.     --ARELIS Pond CNP on 10/20/2021 at 1:15 PM    An electronic signature was used to authenticate this note. This dictation was generated by voice recognition computer software. Although all attempts are made to edit the dictation for accuracy, there may be errors in the transcription that are not intended.

## 2022-03-16 ENCOUNTER — TELEPHONE (OUTPATIENT)
Dept: PRIMARY CARE CLINIC | Age: 47
End: 2022-03-16

## 2022-03-16 ASSESSMENT — ENCOUNTER SYMPTOMS
WHEEZING: 0
COUGH: 0
APNEA: 0
CHEST TIGHTNESS: 0
SHORTNESS OF BREATH: 1

## 2022-03-16 NOTE — TELEPHONE ENCOUNTER
Pt scheduled a mychart visit for SOB. I LVM for the pt to obtain more information about his symptoms. Is the pt having any other sx besides SOB. Does the pt have dx of Asthma? Please do the covid screen if the pt calls back. May need to change his appt to a vv.

## 2022-03-16 NOTE — PROGRESS NOTES
3/17/2022    Chief Complaint   Patient presents with    Rash     Over 1 year    Shortness of Breath     Persistent       Kelli Hernandez is a 40 y.o. male, presents today for persistent dyspnea with exertion and body rash. HPI   Rash  Mr. Quiroga reports 1 year history of persistent wide spread, pruritic rash to truck, upper and lower extremities. Rash has progressively worsened since onset. Patient states \"something is going on with me for over a year and I'm really frustrated! \" Rash worsens with sun exposure and exposure to chlorine. Patient occasionally swims laps for exercise    He has been examined by Dermatologist, Dr. Donis Biswas and prescribed Triamcinolone 0.1% that was applied topically to rash twice daily. Patient states, \"He just uses cream and that don't really work\". He has also been prescribed Hydroxyzine- taken only \"When it flares up in the middle of the night and I can't sleep\". Denies exposure to new soap(s), lotions, detergents. Dyspnea on exertion  History of intermittent dyspnea on exertion (exercise) present for several years. Patient was examined for this problem on  9/29/2021 and 10/20/2021. Continues to have dyspnea with swimming labs requiring him to rest between laps. Patient has more concern for rash today. Mr. Quiroga was referred to Pulmonologist, Dr. Markus Quiñonez at appointment on 10/20/2021 for this problem- Appointment not scheduled when he called Dr. Sunny Blas office, stating \"I don't have sleep apnea\". Referral to Dr. April Horne reprinted today with instructions to schedule appointment.      Previous workup for dyspnea   Ref Range & Units 10/1/21 0822   D-Dimer, Quant 0 - 229 ng/mL DDU <200       Component Ref Range & Units 10/1/21 0822 6/3/21 0712 5/15/19 1038   WBC 4.0 - 11.0 K/uL 7.2  7.1  6.0    RBC 4.20 - 5.90 M/uL 5.38  5.30  5.23    Hemoglobin 13.5 - 17.5 g/dL 16.6  16.9  16.7    Hematocrit 40.5 - 52.5 % 49.9  48.5  48.3    MCV 80.0 - 100.0 fL 92.6  91.5  92.3    MCH 26.0 - 34.0 pg 30.8  31.9  31.9    MCHC 31.0 - 36.0 g/dL 33.2  34.9  34.6    RDW 12.4 - 15.4 % 13.7  13.9  13.0    Platelets 903 - 365 K/uL 207  220  198    MPV 5.0 - 10.5 fL 9.1  8.6  8.5    Neutrophils % % 56.4  53.6  60.7    Lymphocytes % % 32.1  34.5  29.3    Monocytes % % 8.3  8.6  7.2    Eosinophils % % 2.5  2.7  2.2    Basophils % % 0.7  0.6  0.6    Neutrophils Absolute 1.7 - 7.7 K/uL 4.1  3.8  3.6    Lymphocytes Absolute 1.0 - 5.1 K/uL 2.3  2.5  1.8    Monocytes Absolute 0.0 - 1.3 K/uL 0.6  0.6  0.4    Eosinophils Absolute 0.0 - 0.6 K/uL 0.2  0.2  0.1    Basophils Absolute 0.0 - 0.2 K/uL 0.0  0.0  0.0         EXAMINATION:   TWO XRAY VIEWS OF THE CHEST       10/11/2021 6:35 pm       COMPARISON:   None.       HISTORY:   ORDERING SYSTEM PROVIDED HISTORY: Dyspnea on exertion   TECHNOLOGIST PROVIDED HISTORY:   Reason for exam:->shortness of breath with exercise   Reason for Exam: shortness of breath with exercise, Dyspnea on exertion   Acuity: Unknown   Type of Exam: Unknown       FINDINGS:   The lungs are without acute focal process.  There is no effusion or   pneumothorax. The cardiomediastinal silhouette is without acute process. The   osseous structures are without acute process.           Impression   No acute process.         Review of lab results.      Vit D, 25-Hydroxy 59.9  >=30 ng/mL 10/01/2021  8:22 AM           Review of Systems   Constitutional: Negative for activity change, appetite change, diaphoresis, fatigue and unexpected weight change. Respiratory: Positive for shortness of breath (with exertion). Negative for apnea, cough, chest tightness and wheezing. Cardiovascular: Negative for chest pain, palpitations and leg swelling. Musculoskeletal: Negative for arthralgias and myalgias. Skin: Positive for rash. Neurological: Negative for dizziness, weakness, light-headedness and headaches. Psychiatric/Behavioral: Negative.         Current Outpatient Medications on File Prior to Visit   Medication Sig Dispense Refill    omeprazole (PRILOSEC) 20 MG delayed release capsule 20 mg      hydrocortisone 2.5 % cream PRN      triamcinolone (KENALOG) 0.1 % cream PRN      fluticasone (FLONASE) 50 MCG/ACT nasal spray 2 sprays by Each Nostril route daily 16 g 5    Cholecalciferol (VITAMIN D3) 50 MCG (2000 UT) TABS Take 1 tablet by mouth daily 90 tablet 1     No current facility-administered medications on file prior to visit. Allergies   Allergen Reactions    Singulair [Montelukast] Rash    Other Itching     Coloring on pill specifically pink and yellow      Past Medical History:   Diagnosis Date    Acute non-recurrent sinusitis 6/22/2021    Change in skin mole 11/23/2020    DNS (deviated nasal septum) 3/9/2020    Epistaxis 6/2/2021    Gastroesophageal reflux disease with esophagitis without hemorrhage 6/2/2021    GERD (gastroesophageal reflux disease)     Headache     Nasal congestion     Nosebleed     Seasonal allergic rhinitis due to pollen 6/8/2021    Substance abuse ([facility] [address]     Tinnitus      Past Surgical History:   Procedure Laterality Date    SEPTOPLASTY N/A 3/17/2020    PARTIAL RECONSTRUCTION OF LEFT INFERIOR TURBINATE, SEPTAL RECONSTRUCTION, LYSIS INTRANASAL SYNECHIA performed by Carol Barboza MD at [facility] History     Tobacco Use    Smoking status: Never Smoker    Smokeless tobacco: Never Used   Substance Use Topics    Alcohol use:  Yes     Alcohol/week: 2.0 standard drinks     Types: 2 Glasses of wine per week     Comment: socially      Family History   Problem Relation Age of Onset    Cancer Mother 67        breast    Heart Disease Father 58    Other Father 43        schizophrenia-assisted care    Other Brother     Cancer Paternal Grandmother         breast    Heart Disease Paternal Grandfather     High Blood Pressure Paternal Grandfather     High Cholesterol Paternal Grandfather     Heart Attack Paternal Grandfather Vitals:    03/17/22 0945   BP: 126/82   Pulse: 80   Temp: 97.2 °F (36.2 °C)   TempSrc: Infrared   SpO2: 99%   Weight: 242 lb (109.8 kg)     Estimated body mass index is 31.07 kg/m² as calculated from the following:    Height as of 9/29/21: 6' 2\" (1.88 m). Weight as of this encounter: 242 lb (109.8 kg). Physical Exam  Vitals and nursing note reviewed. Constitutional:       General: He is not in acute distress. Appearance: Normal appearance. Neck:      Vascular: No carotid bruit. Cardiovascular:      Rate and Rhythm: Normal rate and regular rhythm. Pulses: Normal pulses. Heart sounds: Normal heart sounds, S1 normal and S2 normal.   Pulmonary:      Effort: Pulmonary effort is normal.      Breath sounds: Normal breath sounds. Musculoskeletal:         General: Normal range of motion. Cervical back: Normal range of motion and neck supple. Right lower leg: No edema. Left lower leg: No edema. Lymphadenopathy:      Cervical: No cervical adenopathy. Skin:     General: Skin is warm. Findings: Rash present. Rash is papular (fine) and purpuric (erythmatous). Neurological:      General: No focal deficit present. Mental Status: He is alert and oriented to person, place, and time. Psychiatric:         Attention and Perception: Attention normal.         Mood and Affect: Affect normal. Mood is anxious. Speech: Speech normal.         Behavior: Behavior normal.         Thought Content: Thought content normal.         Cognition and Memory: Cognition normal.         ASSESSMENT/PLAN:  1. Contact dermatitis, unspecified contact dermatitis type, unspecified trigger  - Unchanged  - Start predniSONE (DELTASONE) 20 MG tablet; TAKE 3 TABLETS BY MOUTH for 4 DAYS, 2 TABLETS for 4 DAYS, 1 TABLET for 4 DAY. Take in the morning with food  Dispense: 24 tablet; Refill: 0  - Continue hydrOXYzine (ATARAX) 25 MG tablet;  Take 1 tablet by mouth 2 times daily as needed for Itching  Dispense: 30 tablet; Refill: 1    2. Superficial skin infection  - New  - Start cephALEXin (KEFLEX) 500 MG capsule; Take 1 capsule by mouth 3 times daily for 10 days  Dispense: 30 capsule; Refill: 0    3. Dyspnea on exertion  - Some improvement  - Needs to schedule with Dr. April Horne for further evaluation. 4. Colon cancer screening  - Constantino Del Angel MD, Gastroenterology, [facility]      Return for any new health concerns. Schedule appt. with Dr. April Horne and Dermatologist.     Discussed use, benefit, and side effects of prescribed medications. Patient's questions answered and concerns addressed. Patient agrees to plan of care. My scheduled days in the office reviewed with patient, and same day appointments available. Encouraged to use [employer] for communication as needed. Electronically signed by ARELIS Harris CNP on 3/17/2022 at 6:12 PM       This dictation was generated by voice recognition computer software. Although all attempts are made to edit the dictation for accuracy, there may be errors in the transcription that are not intended.

## 2022-03-17 ENCOUNTER — PATIENT MESSAGE (OUTPATIENT)
Dept: PRIMARY CARE CLINIC | Age: 47
End: 2022-03-17

## 2022-03-17 ENCOUNTER — TELEPHONE (OUTPATIENT)
Dept: PRIMARY CARE CLINIC | Age: 47
End: 2022-03-17

## 2022-03-17 ENCOUNTER — OFFICE VISIT (OUTPATIENT)
Dept: PRIMARY CARE CLINIC | Age: 47
End: 2022-03-17
Payer: COMMERCIAL

## 2022-03-17 VITALS
SYSTOLIC BLOOD PRESSURE: 126 MMHG | DIASTOLIC BLOOD PRESSURE: 82 MMHG | OXYGEN SATURATION: 99 % | BODY MASS INDEX: 31.07 KG/M2 | HEART RATE: 80 BPM | WEIGHT: 242 LBS | TEMPERATURE: 97.2 F

## 2022-03-17 DIAGNOSIS — L08.9 SUPERFICIAL SKIN INFECTION: ICD-10-CM

## 2022-03-17 DIAGNOSIS — L85.3 DRY SKIN DERMATITIS: Primary | ICD-10-CM

## 2022-03-17 DIAGNOSIS — Z12.11 COLON CANCER SCREENING: ICD-10-CM

## 2022-03-17 DIAGNOSIS — L25.9 CONTACT DERMATITIS, UNSPECIFIED CONTACT DERMATITIS TYPE, UNSPECIFIED TRIGGER: Primary | ICD-10-CM

## 2022-03-17 DIAGNOSIS — R06.09 DYSPNEA ON EXERTION: ICD-10-CM

## 2022-03-17 PROCEDURE — 99214 OFFICE O/P EST MOD 30 MIN: CPT | Performed by: NURSE PRACTITIONER

## 2022-03-17 RX ORDER — HYDROXYZINE HYDROCHLORIDE 25 MG/1
25 TABLET, FILM COATED ORAL
COMMUNITY
Start: 2022-02-23 | End: 2022-03-17 | Stop reason: SDUPTHER

## 2022-03-17 RX ORDER — PREDNISONE 20 MG/1
TABLET ORAL
Qty: 24 TABLET | Refills: 0 | Status: SHIPPED | OUTPATIENT
Start: 2022-03-17

## 2022-03-17 RX ORDER — CEPHALEXIN 500 MG/1
500 CAPSULE ORAL 3 TIMES DAILY
Qty: 30 CAPSULE | Refills: 0 | Status: SHIPPED | OUTPATIENT
Start: 2022-03-17 | End: 2022-03-27

## 2022-03-17 RX ORDER — TRIAMCINOLONE ACETONIDE 1 MG/G
CREAM TOPICAL
COMMUNITY
Start: 2022-02-23

## 2022-03-17 RX ORDER — HYDROXYZINE HYDROCHLORIDE 25 MG/1
25 TABLET, FILM COATED ORAL 2 TIMES DAILY PRN
Qty: 30 TABLET | Refills: 1 | Status: SHIPPED | OUTPATIENT
Start: 2022-03-17 | End: 2022-04-01

## 2022-03-17 RX ORDER — OMEPRAZOLE 20 MG/1
20 CAPSULE, DELAYED RELEASE ORAL
COMMUNITY
Start: 2022-02-23

## 2022-03-17 NOTE — TELEPHONE ENCOUNTER
From: Vicky Covarrubias  To: Murry Goodell  Sent: 3/17/2022 10:49 AM EDT  Subject: Dermatologist     Tried [facility] and they dont take my insurance. [employer] closed there office in [address] [place] [address] Place. Im on call back list for dermatology group. May just have to wait till April to see the eliazar I saw before. Thank you for seeing me today.

## 2022-03-17 NOTE — TELEPHONE ENCOUNTER
From: LAQUITA Salinas  To: Isabel Magallon  Sent: 3/17/2022 4:19 PM EDT  Subject: Colonoscopy referral    Here is the information for the Colonoscopy.      Thanks, [id number] Angel Gloria MD   [address] [place] [address]   Phone: [phone]

## 2022-03-17 NOTE — TELEPHONE ENCOUNTER
Elisa Ganser placed some orders from today's visit. Order was placed for a colonoscopy because he is due to his age age group. This will be a screening for colon cancer. Please reach out to Dr Ricki Mishra office at [phone]. Spoke with the pt.  Pt aware and asking me to send via [employer]

## 2022-03-17 NOTE — PATIENT INSTRUCTIONS
Patient Education        Dermatitis: Care Instructions  Your Care Instructions  Dermatitis is the general name used for any rash or inflammation of the skin. Different kinds of dermatitis cause different kinds of rashes. Common causes of a rash include new medicines, plants (such as poison oak or poison ivy), heat, and stress. Certain illnesses can also cause a rash. An allergic reaction to something that touches your skin, such as latex, nickel, or poison ivy, is called contact dermatitis. Contact dermatitis may also be caused by something that irritates the skin, such as bleach, a chemical, or soap. These types of rashes cannot be spread from person to person. How long your rash will last depends on what caused it. Rashes may last a few days or months. Follow-up care is a key part of your treatment and safety. Be sure to make and go to all appointments, and call your doctor if you are having problems. It's also a good idea to know your test results and keep a list of the medicines you take. How can you care for yourself at home? · Do not scratch the rash. Cut your nails short, and file them smooth. Or wear gloves if this helps keep you from scratching. · Wash the area with water only. Pat dry. · Put cold, wet cloths on the rash to reduce itching. · Keep cool, and stay out of the sun. · Leave the rash open to the air as much as possible. · If the rash itches, use hydrocortisone cream. Follow the directions on the label. Calamine lotion may help for plant rashes. · If itching affects your sleep, ask your doctor if you can take an antihistamine that might reduce itching and make you sleepy, such as diphenhydramine (Benadryl). Be safe with medicines. Read and follow all instructions on the label. · If your doctor prescribed a cream, use it as directed. If your doctor prescribed medicine, take it exactly as directed. When should you call for help?    Call your doctor now or seek immediate medical care if:    · You have symptoms of infection, such as:  ? Increased pain, swelling, warmth, or redness. ? Red streaks leading from the area. ? Pus draining from the area. ? A fever.     · You have joint pain along with the rash. Watch closely for changes in your health, and be sure to contact your doctor if:    · Your rash is changing or getting worse.     · You are not getting better as expected. Where can you learn more? Go to https://"[employer]"[email or web address].[employer].com. org and sign in to your [employer] account. Enter [phone] in the [employer] box to learn more about \"Dermatitis: Care Instructions. \"     If you do not have an account, please click on the \"Sign Up Now\" link. Current as of: March 3, 2021               Content Version: 13.1  © 9021-2944 Healthwise, Incorporated. Care instructions adapted under license by [facility] ([facility]). If you have questions about a medical condition or this instruction, always ask your healthcare professional. [place] [address] any warranty or liability for your use of this information.

## 2022-04-19 ENCOUNTER — OFFICE VISIT (OUTPATIENT)
Dept: PULMONOLOGY | Age: 47
End: 2022-04-19
Payer: COMMERCIAL

## 2022-04-19 VITALS
OXYGEN SATURATION: 98 % | WEIGHT: 243 LBS | DIASTOLIC BLOOD PRESSURE: 82 MMHG | HEIGHT: 74 IN | BODY MASS INDEX: 31.18 KG/M2 | HEART RATE: 74 BPM | SYSTOLIC BLOOD PRESSURE: 124 MMHG

## 2022-04-19 DIAGNOSIS — R06.09 DOE (DYSPNEA ON EXERTION): Primary | ICD-10-CM

## 2022-04-19 PROCEDURE — 99204 OFFICE O/P NEW MOD 45 MIN: CPT | Performed by: INTERNAL MEDICINE

## 2022-04-19 RX ORDER — ALBUTEROL SULFATE 90 UG/1
2 AEROSOL, METERED RESPIRATORY (INHALATION) 4 TIMES DAILY PRN
Qty: 18 G | Refills: 5 | Status: SHIPPED | OUTPATIENT
Start: 2022-04-19

## 2022-04-19 ASSESSMENT — ENCOUNTER SYMPTOMS
VOICE CHANGE: 0
DIARRHEA: 0
SINUS PRESSURE: 0
SHORTNESS OF BREATH: 1
CONSTIPATION: 0
SORE THROAT: 0
WHEEZING: 0
STRIDOR: 0
RHINORRHEA: 0
BLOOD IN STOOL: 0
APNEA: 0
COUGH: 0
ABDOMINAL DISTENTION: 0
ABDOMINAL PAIN: 0
BACK PAIN: 0
ANAL BLEEDING: 0
CHOKING: 0
CHEST TIGHTNESS: 0

## 2022-04-19 NOTE — PROGRESS NOTES
Roque Almeida    YOB: 1975     Date of Service:  4/19/2022     Chief Complaint   Patient presents with    New Patient     REF BY DR Soo Christianson    Shortness of Breath    Fatigue    Cough     productive white foamy mucus         HPI referred for consultation by Brad Farah for an evaluation of dyspnea with exertion. Patient states that he has noticed decreased energy and stamina despite being very active-ongoing for over a year or so. Lately he has also noticed dry skin rash with associated papular lesions with frequent flares all over his body. He is an avid swimmer and works out at [employer], states that he notices increased chest tightness more sooner than what he is used to in the past.  Denies any chest pain, palpitations, orthopnea or leg edema. He also sometimes has a sensation of \"warmness\" in the upper esophagus, which he attributes to history of GERD. No prior history of asthma or allergies. But he states that he has had rhinitis type symptoms all his life. Never smoked, but had secondhand smoke exposure. Strong family history of CAD. 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  He has a good appetite and weight has been steady. Denies any joint pains or history of arthritis.     Allergies   Allergen Reactions    Singulair [Montelukast] Rash    Other Itching     Coloring on pill specifically pink and yellow      Outpatient Medications Marked as Taking for the 4/19/22 encounter (Office Visit) with Farooq Perdomo MD   Medication Sig Dispense Refill    albuterol sulfate HFA (VENTOLIN HFA) 108 (90 Base) MCG/ACT inhaler Inhale 2 puffs into the lungs 4 times daily as needed for Wheezing 18 g 5    omeprazole (PRILOSEC) 20 MG delayed release capsule 20 mg      hydrocortisone 2.5 % cream PRN      triamcinolone (KENALOG) 0.1 % cream PRN         Immunization History   Administered Date(s) Administered    COVID-[employer], [employer] - innovate | mentor | invest [facility], DO NOT Dilute, Reyes-Sucrose, 12+ yrs, PF, 30 mcg/0.3 mL dose 03/08/2022    COVID-19, Pfizer Purple top, DILUTE for use, 12+ yrs, 30mcg/0.3mL dose 07/20/2021, 08/10/2021    Influenza, MDCK Quadv, IM, PF (Flucelvax 2 yrs and older) 09/29/2021    Influenza, Quadv, IM, PF (6 mo and older Fluzone, Flulaval, Fluarix, and 3 yrs and older Afluria) 10/22/2020    Tdap (Boostrix, Adacel) 09/29/2021       Past Medical History:   Diagnosis Date    Acute non-recurrent sinusitis 6/22/2021    Change in skin mole 11/23/2020    DNS (deviated nasal septum) 3/9/2020    Epistaxis 6/2/2021    Gastroesophageal reflux disease with esophagitis without hemorrhage 6/2/2021    GERD (gastroesophageal reflux disease)     Headache     Nasal congestion     Nosebleed     Seasonal allergic rhinitis due to pollen 6/8/2021    Substance abuse ([facility] [address]     Tinnitus      Past Surgical History:   Procedure Laterality Date    SEPTOPLASTY N/A 3/17/2020    PARTIAL RECONSTRUCTION OF LEFT INFERIOR TURBINATE, SEPTAL RECONSTRUCTION, LYSIS INTRANASAL SYNECHIA performed by Daniel Hollins MD at [facility] History   Problem Relation Age of Onset    Cancer Mother 67        breast    Heart Disease Father 58    Other Father 43        schizophrenia-assisted care    Other Brother     Cancer Paternal Grandmother         breast    Heart Disease Paternal Grandfather     High Blood Pressure Paternal Grandfather     High Cholesterol Paternal Grandfather     Heart Attack Paternal Grandfather        Review of Systems:  Review of Systems   Constitutional: Positive for fatigue. Negative for activity change, appetite change and fever. HENT: Negative for congestion, ear discharge, ear pain, postnasal drip, rhinorrhea, sinus pressure, sneezing, sore throat, tinnitus and voice change. Respiratory: Positive for shortness of breath. Negative for apnea, cough, choking, chest tightness, wheezing and stridor.     Cardiovascular: Negative for chest pain, palpitations and leg swelling. Gastrointestinal: Negative for abdominal distention, abdominal pain, anal bleeding, blood in stool, constipation and diarrhea. Musculoskeletal: Negative for arthralgias, back pain and gait problem. Skin: Positive for rash. Negative for pallor. Papular skin lesion   Allergic/Immunologic: Negative for environmental allergies. Neurological: Negative for dizziness, tremors, seizures, syncope, speech difficulty, weakness, light-headedness, numbness and headaches. Hematological: Negative for adenopathy. Does not bruise/bleed easily. Psychiatric/Behavioral: Negative for sleep disturbance. Vitals:    04/19/22 1427   BP: 124/82   Pulse: 74   SpO2: 98%   Weight: 243 lb (110.2 kg)   Height: 6' 2\" (1.88 m)     No flowsheet data found. Body mass index is 31.2 kg/m². Wt Readings from Last 3 Encounters:   04/19/22 243 lb (110.2 kg)   03/17/22 242 lb (109.8 kg)   10/14/21 227 lb (103 kg)     BP Readings from Last 3 Encounters:   04/19/22 124/82   03/17/22 126/82   10/14/21 112/80         Physical Exam  Constitutional:       General: He is not in acute distress. Appearance: He is well-developed. He is not diaphoretic. HENT:      Mouth/Throat:      Pharynx: No oropharyngeal exudate. Cardiovascular:      Rate and Rhythm: Normal rate and regular rhythm. Heart sounds: Normal heart sounds. No murmur heard. Pulmonary:      Effort: No respiratory distress. Breath sounds: Normal breath sounds. No wheezing, rhonchi or rales. Chest:      Chest wall: No tenderness. Abdominal:      General: There is no distension. Palpations: There is no mass. Tenderness: There is no abdominal tenderness. There is no guarding or rebound. Musculoskeletal:         General: No swelling, tenderness or deformity. Skin:     Coloration: Skin is not pale. Findings: No erythema or rash.    Neurological:      Mental Status: He is alert and oriented to person, place, and time. Cranial Nerves: No cranial nerve deficit. Motor: No abnormal muscle tone. Coordination: Coordination normal.      Deep Tendon Reflexes: Reflexes normal.             Health Maintenance   Topic Date Due    Hepatitis C screen  Never done    Colorectal Cancer Screen  Never done    Depression Screen  06/02/2022    Lipid screen  10/01/2026    DTaP/Tdap/Td vaccine (2 - Td or Tdap) 09/29/2031    Flu vaccine  Completed    COVID-19 Vaccine  Completed    HIV screen  Completed    Hepatitis A vaccine  Aged Out    Hepatitis B vaccine  Aged Out    Hib vaccine  Aged Out    Meningococcal (ACWY) vaccine  Aged Out    Pneumococcal 0-64 years Vaccine  Aged Out          Assessment/Plan:     Diagnosis Orders   1. DIAS (dyspnea on exertion)  XR CHEST STANDARD (2 VW)    EKG 12 Lead    Full PFT Study With Bronchodilator    ECHO Complete 2D W Doppler W Color    CARDIAC STRESS TEST EXERCISE ONLY      DIAS, which could be related to exercise-induced asthma or deconditioning. Trial of albuterol inhaler as needed. Will perform complete PFT study to evaluate for obstructive airway disease or significant bronchodilator reversibility. Strong family history of CAD-Father. Check twelve-lead EKG, exercise stress test and 2D echocardiogram in order to rule out a cardiac etiology of patient's symptoms. Doubt value of CT imaging, will obtain chest x-ray in order to rule out any acute infiltrates or pleural effusions. Prior chest x-ray from October 2021 was personally reviewed, appears normal.    Skin rash/papular lesions/pruritic -follows with dermatology, Dr. Noris Hart at [facility].  Currently prescribed topical steroid cream.  Considering skin biopsy. Return in about 1 month (around 5/19/2022).

## 2022-05-22 DIAGNOSIS — J30.2 CHRONIC SEASONAL ALLERGIC RHINITIS: ICD-10-CM

## 2022-05-22 RX ORDER — FLUTICASONE PROPIONATE 50 MCG
SPRAY, SUSPENSION (ML) NASAL
Qty: 16 G | Refills: 5 | OUTPATIENT
Start: 2022-05-22

## 2024-11-21 ENCOUNTER — HOSPITAL ENCOUNTER (OUTPATIENT)
Dept: GENERAL RADIOLOGY | Age: 49
Discharge: HOME | End: 2024-11-21
Payer: COMMERCIAL

## 2024-11-21 ENCOUNTER — HOSPITAL ENCOUNTER (OUTPATIENT)
Dept: CT IMAGING | Age: 49
Discharge: HOME | End: 2024-11-21
Payer: COMMERCIAL

## 2024-11-21 ENCOUNTER — HOSPITAL ENCOUNTER (OUTPATIENT)
Age: 49
Discharge: HOME | End: 2024-11-21
Payer: COMMERCIAL

## 2024-11-21 DIAGNOSIS — R53.83 OTHER FATIGUE: ICD-10-CM

## 2024-11-21 DIAGNOSIS — R06.02 SOB (SHORTNESS OF BREATH) ON EXERTION: ICD-10-CM

## 2024-11-21 DIAGNOSIS — R06.02 SOB (SHORTNESS OF BREATH): ICD-10-CM

## 2024-11-21 PROCEDURE — 71046 X-RAY EXAM CHEST 2 VIEWS: CPT

## 2024-11-21 PROCEDURE — 75571 CT HRT W/O DYE W/CA TEST: CPT

## 2025-02-10 NOTE — PROGRESS NOTES
Chief complaints:  Martha Howell is a 44 y.o. male who presents to the office for a general physical examination. History of present illness:  Here for a general physical exam, and fasting blood work,  C/o sob on exertion, and nausea on exertion,  Some fatigue and weakness, episodically,  Some dizziness occasionally,   Eats ok, appetite is ok,   Bowel movements are ok,   Urination is ok,   Sleep ok,   Some allergy symptoms   Past Medical History:   Diagnosis Date    Substance abuse ([facility] [address]      Current Outpatient Medications   Medication Sig Dispense Refill    Loratadine-Pseudoephedrine (CLARITIN-D 24 HOUR PO) Take by mouth       No current facility-administered medications for this visit. Allergies : Patient has no known allergies. History reviewed. No pertinent surgical history. Family History   Problem Relation Age of Onset    Other Brother     Cancer Paternal Grandmother     Heart Disease Paternal Grandfather     High Blood Pressure Paternal Grandfather     High Cholesterol Paternal Grandfather     Heart Attack Paternal Grandfather      Social History     Tobacco Use    Smoking status: Never Smoker    Smokeless tobacco: Never Used   Substance Use Topics    Alcohol use:  Yes     Alcohol/week: 1.2 oz     Types: 2 Glasses of wine per week       Review of systems :  Skin: no abnormal pigmentation, rash, scaling, itching, masses, hair or nail changes  Eyes: negative  Ears/Nose/Throat: negative  Respiratory: negative  Cardiovascular: negative  Gastrointestinal: negative  Genitourinary: negative  Musculoskeletal: negative  Neurologic: negative  Psychiatric: negative  Hematologic/Lymphatic/Immunologic: negative  Endocrine: negative       Objective :     /76 (Site: Right Upper Arm, Position: Sitting, Cuff Size: Medium Adult)   Pulse 81   Ht 6' 2.75\" (1.899 m)   Wt 224 lb (101.6 kg)   SpO2 98%   BMI 28.19 kg/m²   General appearance - healthy, alert, no distress  Skin - Skin color, texture, Inpatient consult to Gastroenterology  Consult performed by: RADHA Salamanca-CNP  Consult ordered by: Edita Luna MD  Reason for consult: Transaminitis          Reason For Consult  Transaminitis    History Of Present Illness  Reta Davis is a 67 y.o. 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  Patient reports that symptoms started last Monday.  She had fever, dizziness and body aches.  [facility] and [facility] positive for influenza B. She was also found to be hypoxic so she was sent for further evaluation to the ER.  She was diagnosed with pneumonia and found to have elevated liver enzymes which are trending up.   Patient endorses that she had elevated liver enzymes in the past caused by chemotherapy.  Denies chronic liver disease.  Also denies EtOH use, tobacco products, marijuana and street drugs.   Today's labs are notable for alk phos 133-> 173, -> 771, -> 496, bili total 0.3, WBC 12.8, UA consistent with UTI, hepatic viral panel negative.     2/9/25 US abdomen limited liver  IMPRESSION:  Unremarkable ultrasound of the right upper quadrant.    2/20/25 CT abdomen pelvis w IV contrast  IMPRESSION:  -Contracted thick-walled gallbladder. No significant biliary dilatation.  -Fluid distention of the right colon which could reflect non-specific colitis or ileus. Follow-up as clinically warranted.  -Bibasilar bronchiectasis and partial consolidation of the visualized left lung base.    1/29/2024 Colonoscopy  Findings  -Healthy end-to-side ileocolonic anastomosis  -The ascending colon, transverse colon, descending colon and rectosigmoid appeared normal.;  - Repeat colonoscopy in 3 years d/t personal history of colon cancer  GI service was consulted for transaminitis.       Past Medical History  She has a past medical history of  Anxiety, Asthma, Carcinoma of appendix (Multi), Depression, Essential (primary) hypertension, and Kidney stone (05/25/2023).    Surgical History  She has a past surgical history that includes Sinus surgery; Appendectomy (2017); Breast biopsy (Left, 2018); US guided thyroid biopsy (11/14/2016); Total abdominal hysterectomy w/ bilateral salpingoophorectomy (07/03/2018); Cystoscopy insertion / removal stent / stone (2021); Colonoscopy; and Breast biopsy (Left, 1990).     Social History  She reports that she has never smoked. She has never been exposed to tobacco smoke. She has never used smokeless tobacco. She reports that she does not currently use alcohol. She reports that she does not currently use drugs.    Family History  Family History   Problem Relation Name Age of Onset    Breast cancer Mother  63    Heart failure Mother      Hypertension Mother      Stroke Mother      Heart attack Mother      Other (malignant neoplasm of breast) Mother      Heart attack Father      Stroke Father      Hypertension Father      Hypertension Sister          Allergies  Antihistamine [diphenhydramine hcl], Levofloxacin, Prednisolone, and Tetracyclines    Review of Systems  Review of Systems   Constitutional:  Positive for appetite change, chills and fever.   HENT: Negative.     Eyes: Negative.    Respiratory:  Positive for shortness of breath.    Cardiovascular: Negative.    Gastrointestinal: Negative.    Endocrine: Negative.    Genitourinary: Negative.    Musculoskeletal: Negative.    Skin: Negative.    Allergic/Immunologic: Negative.    Neurological:  Positive for light-headedness.   Hematological: Negative.    Psychiatric/Behavioral: Negative.           Physical Exam  Physical Exam  Vitals reviewed.   Constitutional:       Appearance: Normal appearance.   HENT:      Head: Atraumatic.   Eyes:      General: No scleral icterus.  Cardiovascular:      Rate and Rhythm: Normal rate and regular rhythm.      Heart sounds: Normal heart  turgor normal. No rashes or lesions. Head - Normocephalic. No masses, lesions, tenderness or abnormalities  Eyes - conjunctivae/corneas clear. PERRL, EOM's intact. Ears - External ears normal. Canals clear. TM's normal.  Nose/Sinuses - Nares normal. Septum midline. Mucosa normal. No drainage or sinus tenderness. Oropharynx - Lips, mucosa, and tongue normal. Teeth and gums normal. Oropharynx pink and patent  Neck - Neck supple. No adenopathy. Thyroid symmetric, normal size,  Back - Back symmetric, no curvature. ROM normal. No CVA tenderness. Lungs - Percussion normal. Good diaphragmatic excursion. Lungs clear  Heart - Regular rate and rhythm, with no rub, murmur or gallop noted. Abdomen - Abdomen soft, non-tender. BS normal. No masses, organomegaly  Extremities - Extremities normal. No deformities, edema, or skin discolora  Musculoskeletal - Spine ROM normal. Muscular strength intact. Peripheral pulses - radial=2+,, femoral=2+, popliteal=2+, dorsalis pedis=2+,  Neuro - Gait normal. Reflexes normal and symmetric. Sensation grossly normal.  No focal weakness       Assessment :     Physical exam.  Fatigue. Plan :      Will get fasting blood work,    Edwin Aranda MD  5/15/2019 10:03 AM sounds.   Pulmonary:      Effort: Pulmonary effort is normal.      Breath sounds: Normal breath sounds.   Abdominal:      General: Bowel sounds are normal. There is no distension.      Palpations: Abdomen is soft.      Tenderness: There is no abdominal tenderness. There is no guarding or rebound.   Musculoskeletal:         General: Normal range of motion.      Cervical back: Neck supple.   Skin:     Coloration: Skin is not jaundiced.   Neurological:      General: No focal deficit present.      Mental Status: She is alert and oriented to person, place, and time.   Psychiatric:         Mood and Affect: Mood normal.         Behavior: Behavior normal.             Last Recorded Vitals  /74 (BP Location: Right arm, Patient Position: Lying)   Pulse 86   Temp 36.6 °C (97.9 °F) (Temporal)   Resp 18   Wt 68.5 kg (151 lb)   SpO2 97%     Relevant Results  Scheduled medications  [START ON 2/11/2025] azithromycin, 500 mg, oral, q24h  cefTRIAXone, 1 g, intravenous, q24h  enoxaparin, 40 mg, subcutaneous, q24h  lisinopril, 20 mg, oral, Daily  nystatin, 5 mL, Swish & Swallow, TID  oseltamivir, 30 mg, oral, BID  pantoprazole, 40 mg, oral, Daily before breakfast   Or  pantoprazole, 40 mg, intravenous, Daily before breakfast  polyethylene glycol, 17 g, oral, Daily      Continuous medications     PRN medications  PRN medications: acetaminophen **OR** acetaminophen **OR** acetaminophen, benzonatate, guaiFENesin, ipratropium-albuteroL, melatonin, ondansetron **OR** ondansetron, oxygen, polyethylene glycol, traMADol      Results for orders placed or performed during the hospital encounter of 02/08/25 (from the past 24 hours)   CBC   Result Value Ref Range    WBC 12.8 (H) 4.4 - 11.3 x10*3/uL    nRBC 0.0 0.0 - 0.0 /100 WBCs    RBC 3.95 (L) 4.00 - 5.20 x10*6/uL    Hemoglobin 12.3 12.0 - 16.0 g/dL    Hematocrit 37.5 36.0 - 46.0 %    MCV 95 80 - 100 fL    MCH 31.1 26.0 - 34.0 pg    MCHC 32.8 32.0 - 36.0 g/dL    RDW 12.5 11.5 - 14.5 %     Platelets 290 150 - 450 x10*3/uL   Comprehensive Metabolic Panel   Result Value Ref Range    Glucose 245 (H) 74 - 99 mg/dL    Sodium 138 136 - 145 mmol/L    Potassium 3.9 3.5 - 5.3 mmol/L    Chloride 100 98 - 107 mmol/L    Bicarbonate 27 21 - 32 mmol/L    Anion Gap 15 10 - 20 mmol/L    Urea Nitrogen 36 (H) 6 - 23 mg/dL    Creatinine 0.87 0.50 - 1.05 mg/dL    eGFR 74 >60 mL/min/1.73m*2    Calcium 9.2 8.6 - 10.3 mg/dL    Albumin 3.3 (L) 3.4 - 5.0 g/dL    Alkaline Phosphatase 173 (H) 33 - 136 U/L    Total Protein 7.5 6.4 - 8.2 g/dL     (H) 9 - 39 U/L    Bilirubin, Total 0.3 0.0 - 1.2 mg/dL     (H) 7 - 45 U/L     *Note: Due to a large number of results and/or encounters for the requested time period, some results have not been displayed. A complete set of results can be found in Results Review.    CT abdomen pelvis w IV contrast    Result Date: 2/10/2025  Interpreted By:  Stacia Maurice, STUDY: CT ABDOMEN PELVIS W IV CONTRAST;  2/10/2025 9:20 am   INDICATION: Signs/Symptoms:Transaminitis.     COMPARISON: 11/10/2023   ACCESSION NUMBER(S): [id number]   ORDERING CLINICIAN: JO ANN LACY   TECHNIQUE: CT of the abdomen and pelvis was performed. Sagittal and coronal reconstructions were generated.  75 ML Omnipaque 350 intravenous contrast given for the exam.   FINDINGS:     ABDOMINAL ORGANS:   LIVER: No focal lesion   GALL BLADDER AND BILIARY TREE: Contracted thick-walled gallbladder. No significant biliary dilatation.   SPLEEN: No focal lesion   PANCREAS: No focal lesion   ADRENALS: No adrenal mass   KIDNEYS AND URETERS: Subcentimeter hypodensities in each kidney. No hydronephrosis   BOWEL: No abnormally dilated small bowel loops. Right lower quadrant suture line again seen. Distended fluid-filled right colon. Distal colon diverticulosis.   PERITONEUM, RETROPERITONEUM, NODES: No significant free fluid. No free air. No significant retroperitoneal lymphadenopathy. 0.8 cm soft tissue density possible lymph  lymph node or diverticulum adjacent to the sigmoid colon image 107/150. Nodular soft tissue thickening contiguous with the distal sigmoid colon and left vaginal cuff image 114 and 115/150 similar to the previous exam.   VESSELS:  Scattered atherosclerotic calcifications. No abdominal aortic aneurysm.   PELVIS: Urinary bladder is moderately distended without focal wall thickening. Stable presumed surgical absence of the uterus again with soft tissue thickening contiguous with the vaginal cuff.   ABDOMINAL WALL: No sizable abdominal wall hernia.   BONES: Multifocal presumed degenerative changes. Mild scoliosis.   LOWER CHEST: Patchy bibasilar opacities and subtotal consolidation of the visualized left lower lobe. Bibasilar bronchiectasis and bronchial wall thickening.       Contracted thick-walled gallbladder. No significant biliary dilatation.   Fluid distention of the right colon which could reflect nonspecific colitis or ileus. Follow-up as clinically warranted.   Bibasilar bronchiectasis and partial consolidation of the visualized left lung base.   Too small to characterize hypodensities in each kidney.   Additional findings as described above.   MACRO: None.   Signed by: Stacia Maurice 2/10/2025 9:42 AM Dictation workstation:   [id number]    US abdomen limited liver    Result Date: 2/9/2025  Interpreted By:  Vishal Almazan, STUDY: US ABDOMEN LIMITED LIVER;  2/9/2025 3:34 pm   INDICATION: Signs/Symptoms:Transaminitis.     COMPARISON: CT chest, abdomen and pelvis of 10/26/2022.   ACCESSION NUMBER(S): [id number]   ORDERING CLINICIAN: JO ANN LACY   TECHNIQUE: Real-time sonographic evaluation of the right upper quadrant was performed.   FINDINGS: LIVER: The liver is homogeneous in echotexture. No focal hepatic lesion is identified.  Liver measures 15 cm in sagittal dimension.   GALLBLADDER: The gallbladder is nondistended and demonstrates no evidence of gallstones, wall thickening or surrounding fluid. The  gallbladder wall measures 2 mm. Sonographic Santana's sign is negative.   BILIARY TREE: Common bile duct is not dilated measuring 4 mm in diameter.   PANCREAS: The visualized pancreas is unremarkable in appearance. Pancreatic distal body and tail are obscured by bowel gas.   RIGHT KIDNEY: Right kidney measures  10.3cm in length.  No right hydronephrosis is seen.       Unremarkable ultrasound of the right upper quadrant.   MACRO: None.   Signed by: Vishal Almazan 2/9/2025 4:21 PM Dictation workstation:   [id number]    CT angio chest for pulmonary embolism    Result Date: 2/8/2025  Interpreted By:  Melinda Martins, STUDY: CT ANGIO CHEST FOR PULMONARY EMBOLISM;  2/8/2025 4:53 pm   INDICATION: Signs/Symptoms:sob and pain.   COMPARISON: 11/10/2023   ACCESSION NUMBER(S): [id number]   ORDERING CLINICIAN: CHIRAG PEDERSON   TECHNIQUE: Helical data acquisition of the chest was obtained after intravenous administration of 75 mL Omnipaque 350, as per PE protocol. Images were reformatted in coronal and sagittal planes. Axial and coronal maximum intensity projection (MIP) images were created and reviewed.   FINDINGS: POTENTIAL LIMITATIONS OF THE STUDY: None   HEART AND VESSELS:   There are no discrete filling defects within main pulmonary artery and its branches to suggest acute pulmonary embolism.   Main pulmonary artery and its branches are normal in caliber.   The thoracic aorta normal in course and caliber.   No coronary artery calcifications are seen. Please note, the study is not optimized for evaluation of coronary arteries.   The cardiac chambers are not enlarged.   Trace amount of pericardial effusion or pericardial thickening similar to prior.   MEDIASTINUM AND FEROZ, LOWER NECK AND AXILLA:   The visualized thyroid gland is within normal limits.   No evidence of thoracic lymphadenopathy by CT criteria. Subcentimeter nonspecific mediastinal lymph nodes none of which reaches pathological size by CT imaging  criteria. The largest in the AP window measures 9 mm in short axis.   Tiny hiatal hernia. Esophagus otherwise within normal limits.   LUNGS AND AIRWAYS: The trachea and central airways are patent. No endobronchial lesion is seen.   Diffuse, patchy, ground-glass and nodular infiltrate within right upper lobe and to a lesser extent, left upper and right lower lobes, new since previous exam. Similar infiltrate/consolidation within the left lower lobe with associated bronchiectasis and bronchial wall thickening, worse when compared to previous exam associated. There is redemonstration of left lower lobe volume loss. No pleural effusion. No pneumothorax.     UPPER ABDOMEN: The visualized subdiaphragmatic structures demonstrate no remarkable findings.   CHEST WALL AND OSSEOUS STRUCTURES:   Chest wall is within normal limits. No acute osseous pathology.There are no suspicious osseous lesions.       1. Interval worsening of left lower lobe infiltrate and left lung volume loss with associated bronchiectasis when compared to previous exam. 2. Interval development of left upper lobe and right lung milder infiltrate as above. Clinical correlation and follow-up recommended to document resolution. 3. No evidence for acute pulmonary embolic disease.     MACRO: None   Signed by: Melinda Martins 2/8/2025 5:46 PM Dictation workstation:   [id number]    XR chest 2 views    Result Date: 2/8/2025  Interpreted By:  Andrew Roper, STUDY: XR CHEST 2 VIEWS;  2/8/2025 3:55 pm   INDICATION: Signs/Symptoms:hypoxia.     COMPARISON: 02/17/2023   ACCESSION NUMBER(S): [id number]   ORDERING CLINICIAN: CHIRAG PEDERSON   FINDINGS:         CARDIOMEDIASTINAL SILHOUETTE: Cardiomediastinal silhouette is normal in size and configuration.   LUNGS: The lungs are hyperinflated. There is dense airspace disease at the left lung base. There is a small left effusion. The right lung is clear..   ABDOMEN: No remarkable upper abdominal findings.    BONES: No acute osseous changes.       1. Left basilar airspace disease and small effusion compatible with pneumonia. Radiographic follow-up to resolution in 2-3 weeks is advised. 2. Underlying emphysematous changes       MACRO: None   Signed by: Andrew Roper 2/8/2025 4:01 PM Dictation workstation:   [id number]      Assessment/Plan     Reta Davis is a 67 y.o. 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   GI service was consulted for transaminitis.  Etiologies include DILI (oseltamivir, azithromycin, ceftriaxone), viral disease, ischemia, autoimmune hepatitis.     -Diet as tolerated  -Avoid hepatotoxic medication if possible  -Continue to trend LFTs  -Will consider ordering US liver with Doppler and the rest of chronic liver labs if LFTs continue trending up  -Supportive care as per primary team  -GI will follow      Will discuss with Dr. Trista Cruz, APRN-CNP

## (undated) DEVICE — TUBING, SUCTION, 1/4" X 12', STRAIGHT: Brand: MEDLINE

## (undated) DEVICE — SUTURE CHROMIC GUT SZ 4-0 L12IN ABSRB BRN M-1 L13MM 1/2 CIR 744G

## (undated) DEVICE — STANDARD HYPODERMIC NEEDLE,POLYPROPYLENE HUB: Brand: MONOJECT

## (undated) DEVICE — SYRINGE MED 10ML TRNSLUC BRL PLUNG BLK MRK POLYPR CTRL

## (undated) DEVICE — ELECTROSURGICAL PENCIL ROCKER SWITCH NON COATED BLADE ELECTRODE 10 FT (3 M) CORD HOLSTER: Brand: MEGADYNE

## (undated) DEVICE — COUNTER NDL 40 COUNT HLD 70 NUM FOAM BLK SGL MAG W BLDE REMV

## (undated) DEVICE — ADHESIVE LIQ COMPOUND TINC BENZ AMPULE

## (undated) DEVICE — SURE SET-DOUBLE BASIN-LF: Brand: MEDLINE INDUSTRIES, INC.

## (undated) DEVICE — PLATE ES AD W 9FT CRD 2

## (undated) DEVICE — GAUZE,SPONGE,2"X2",12PLY,NS,LF,8000/CS: Brand: MEDLINE INDUSTRIES, INC.

## (undated) DEVICE — PACKING 470404 MEROCEL 2000 10PK 8CM: Brand: MEROCEL®

## (undated) DEVICE — GARMENT,MEDLINE,DVT,INT,CALF,MED, GEN2: Brand: MEDLINE

## (undated) DEVICE — PACK,EENT,TURBAN DRAPE,PK II: Brand: MEDLINE

## (undated) DEVICE — JEWISH HOSPITAL TURNOVER KIT: Brand: MEDLINE INDUSTRIES, INC.

## (undated) DEVICE — SPONGE,NEURO,1"X3",XR,STRL,LF,10/PK: Brand: MEDLINE

## (undated) DEVICE — INTENDED FOR TISSUE SEPARATION, AND OTHER PROCEDURES THAT REQUIRE A SHARP SURGICAL BLADE TO PUNCTURE OR CUT.: Brand: BARD-PARKER ® CARBON RIB-BACK BLADES

## (undated) DEVICE — SYRINGE BULB 50/CS 48/PLT: Brand: MEDEGEN MEDICAL PRODUCTS, LLC

## (undated) DEVICE — GAUZE,SPONGE,4"X4",16PLY,XRAY,STRL,LF: Brand: MEDLINE

## (undated) DEVICE — E-Z CLEAN, NON-STICK, PTFE COATED, ELECTROSURGICAL NEEDLE ELECTRODE, MODIFIED EXTENDED INSULATION, 2.75 INCH (7 CM): Brand: MEGADYNE

## (undated) DEVICE — GLOVE SURG BEAD CUF 7 STD PF WHT STRL TRIUMPH LT LTX

## (undated) DEVICE — SOLUTION IV 1000ML 0.9% SOD CHL